# Patient Record
Sex: FEMALE | Race: WHITE | NOT HISPANIC OR LATINO | URBAN - METROPOLITAN AREA
[De-identification: names, ages, dates, MRNs, and addresses within clinical notes are randomized per-mention and may not be internally consistent; named-entity substitution may affect disease eponyms.]

---
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
## 2022-02-18 ENCOUNTER — VBI (OUTPATIENT)
Dept: ADMINISTRATIVE | Facility: OTHER | Age: 22
End: 2022-02-18

## 2022-02-18 NOTE — TELEPHONE ENCOUNTER
Upon review of the In Basket request we were able to locate, review, and update the patient chart as requested for Chlamydia  Any additional questions or concerns should be emailed to the Practice Liaisons via [email or web address] email, please do not reply via In Basket      Thank you  Farheen Larkin

## 2022-03-03 PROBLEM — Q51.22: Status: ACTIVE | Noted: 2022-03-03

## 2022-03-04 ENCOUNTER — OFFICE VISIT (OUTPATIENT)
Dept: OBGYN CLINIC | Facility: CLINIC | Age: 22
End: 2022-03-04
Payer: COMMERCIAL

## 2022-03-04 VITALS
DIASTOLIC BLOOD PRESSURE: 72 MMHG | BODY MASS INDEX: 26.34 KG/M2 | SYSTOLIC BLOOD PRESSURE: 120 MMHG | WEIGHT: 173.8 LBS | HEIGHT: 68 IN

## 2022-03-04 DIAGNOSIS — N90.89 VULVAR IRRITATION: ICD-10-CM

## 2022-03-04 DIAGNOSIS — Q51.22 PARTIAL DOUBLING OF UTERUS: Primary | ICD-10-CM

## 2022-03-04 DIAGNOSIS — R39.9 SYMPTOMS OF URINARY TRACT INFECTION: ICD-10-CM

## 2022-03-04 LAB
BV WHIFF TEST VAG QL: NORMAL
CLUE CELLS SPEC QL WET PREP: NORMAL
PH SMN: 4 [PH]
SL AMB  POCT GLUCOSE, UA: NEGATIVE
SL AMB LEUKOCYTE ESTERASE,UA: ABNORMAL
SL AMB POCT BLOOD,UA: ABNORMAL
SL AMB POCT KETONES,UA: NEGATIVE
SL AMB POCT NITRITE,UA: NEGATIVE
SL AMB POCT PH,UA: 5
SL AMB POCT SPECIFIC GRAVITY,UA: 1.02
SL AMB POCT URINE PROTEIN: NEGATIVE
T VAGINALIS VAG QL WET PREP: NORMAL
YEAST VAG QL WET PREP: NORMAL

## 2022-03-04 PROCEDURE — 99214 OFFICE O/P EST MOD 30 MIN: CPT | Performed by: OBSTETRICS & GYNECOLOGY

## 2022-03-04 PROCEDURE — 87210 SMEAR WET MOUNT SALINE/INK: CPT | Performed by: OBSTETRICS & GYNECOLOGY

## 2022-03-04 PROCEDURE — 81002 URINALYSIS NONAUTO W/O SCOPE: CPT | Performed by: OBSTETRICS & GYNECOLOGY

## 2022-03-04 RX ORDER — RISPERIDONE 1 MG/1
1.25 TABLET, FILM COATED ORAL DAILY
COMMUNITY

## 2022-03-04 RX ORDER — FLUCONAZOLE 150 MG/1
150 TABLET ORAL ONCE
Qty: 1 TABLET | Refills: 0 | Status: SHIPPED | OUTPATIENT
Start: 2022-03-04 | End: 2022-03-04

## 2022-03-04 RX ORDER — LISDEXAMFETAMINE DIMESYLATE 50 MG
CAPSULE ORAL
COMMUNITY
Start: 2022-03-03

## 2022-03-04 RX ORDER — BUSPIRONE HYDROCHLORIDE 30 MG/1
30 TABLET ORAL 2 TIMES DAILY
COMMUNITY

## 2022-03-04 NOTE — PROGRESS NOTES
[address]   [address], [address], [facility] [place]    Assessment/Plan:  1  Partial doubling of uterus  Assessment & Plan:  2020 MRI confirmed partial uterine septum  Pt now wishes removal   Discussed w/ pt this is not something our office does, as this is a specialized procedure  I recommend Dr Beverly Nelson, at [facility], who has performed this for other patient's of mine  Gave pt contact information  She is to call me if has trouble arranging consult  She is to call [facility] and obtain copy of MRI imaging to take to appointment  2  Symptoms of urinary tract infection  Comments:  recent UTI - U/A her small LE and trace blood  Will send Urine culture as UA nonspecific  Pt to see PCP later today  Orders:  -     Urine culture  -     POCT urine dip    3  Vulvar irritation  Comments:  exam unremarkable, wet mount normal today  Discussed low sensitivity for yeast infection  Given recent Abx use will tx with Diflucan  Orders:  -     fluconazole (DIFLUCAN) 150 mg tablet; Take 1 tablet (150 mg total) by mouth once for 1 dose  -     POCT wet mount          Subjective:   Amy Dill is a 22 y o  [place] female  CC: bleeding issues, vulvar irritation, wishes to discuss uterine septum      HPI:   Patient presents with multiple issues: bleeding problems, vulvar irritation and wishes to discuss tx of uterine septum  Pt with progesterone IUD  Since placement - pt reports about every 3 months bleeding about a day  Mostly spotting  Sometimes uses tampons  Sometimes cramps  Last week episode of heavy cramps  with vaginal bleeding for 1 day, last week  Previously dx with uterine septum on u/s - confirmed by MRI  Previously had declined intervention, as was asx, now asking about operation for removal     Pt  treated last week at [facility] with back pain, vomiting fever and urinary urgency  Negative for Covid and UTI but treated with antibiotics for UTI   Pt  now has burning with urination and frequency and back pain  Started Doxycycline last Friday and still taking  Feels symptoms worsened - no pain with urination  But now feels "on fire"  Last month bladder and kidney u/s normal with PCP per pt  Multiple UTIs last couple months  Seeing PCP later today  No vaginal discharge  No new partners last year  Gyn History  Patient's last menstrual period was 2022 (within days)  Last pap smear: n/a - age 22    She  reports being sexually active and has had partner(s) who are male  She reports using the following method of birth control/protection: I U D        OB History      Past Medical History:  No date: Anxiety  No date: Autism  2018: Concussion      Comment:  Tripped over recliner     Past Surgical History:  2019: INSERTION OF INTRAUTERINE DEVICE (IUD)     Social History     Tobacco Use    Smoking status: Never Smoker    Smokeless tobacco: Never Used   Vaping Use    Vaping Use: Former    Substances: CBD   Substance Use Topics    Alcohol use: Yes     Comment: social    Drug use: Not Currently          Current Outpatient Medications:     busPIRone (BUSPAR) 30 MG tablet, Take 30 mg by mouth 2 (two) times a day, Disp: , Rfl:     Doxycycline 100 mg caps- [facility] 7 DAY SUPPLY BOTTLE, SENT WITH PATIENT (VIBRAMYCIN), Take 1 Bottle by mouth once  Take one capsule by mouth twice a day until finished , Disp: , Rfl:     risperiDONE (RisperDAL) 1 mg tablet, Take 1 25 mg by mouth daily, Disp: , Rfl:     Vyvanse 50 MG capsule, TAKE ONE CAPSULE BY MOUTH EVERY DAY fill ON 22, Disp: , Rfl:     She is allergic to diphenhydramine and gluten meal - food allergy       ROS: Review of Systems   Constitutional: Negative  Gastrointestinal: Negative  Genitourinary: Negative  Negative for dyspareunia, dysuria, genital sores and pelvic pain  Vulvar irritation   Psychiatric/Behavioral: Negative          Objective:  /72   Ht 5' 7 5" (1 715 m)   Wt 78 8 kg (173 lb 12 8 oz)   LMP 02/25/2022 (Within Days)   Breastfeeding No   BMI 26 82 kg/m²      Physical Exam  Constitutional:       Appearance: Normal appearance  Genitourinary:     General: Normal vulva  Vagina: Normal  No vaginal discharge, erythema or bleeding  Cervix: Normal       Uterus: Normal  Not enlarged and not tender  Adnexa:         Right: No mass or tenderness  Left: No mass or tenderness  Rectum: No external hemorrhoid  Comments: IUD string at os  Neurological:      Mental Status: She is alert     Psychiatric:         Mood and Affect: Mood normal          Behavior: Behavior normal

## 2022-03-04 NOTE — PATIENT INSTRUCTIONS
Urinalysis in office   - small LE, trace blood, otherwise normal   - will send urine to lab for culture    Vulvar irritation   - likely yeast infection from antibiotics   - Diflucan to pharmacy    Recurrent urinary tract infection   - follow up with PCP   - consider urology referral    For treatment of uterine septum:   - call office of Dr Cynthia Michelle, [phone], [facility] for consult    Please tell them you were referred by Dr Arik Reynolds   - please call [facility], [phone], to ask for a disc with images from your 9/2020 pelvic MRI and any pelvic ultrasounds to take to your visit

## 2022-03-06 LAB
BACTERIA UR CULT: NORMAL
Lab: NO GROWTH

## 2022-04-04 ENCOUNTER — TELEPHONE (OUTPATIENT)
Dept: OBGYN CLINIC | Facility: CLINIC | Age: 22
End: 2022-04-04

## 2022-04-04 NOTE — TELEPHONE ENCOUNTER
Nancy l/alexander she cannot find the phone number for the surgeon dr Corinna Zhong recommended  Would like call back  Per Dr Nasir Redd note>Dr Merline Spell [phone]   Left detailed message on v/m per v/m instruction

## 2022-06-06 ENCOUNTER — OFFICE VISIT (OUTPATIENT)
Dept: URGENT CARE | Facility: CLINIC | Age: 22
End: 2022-06-06
Payer: COMMERCIAL

## 2022-06-06 ENCOUNTER — TELEPHONE (OUTPATIENT)
Dept: OTHER | Facility: OTHER | Age: 22
End: 2022-06-06

## 2022-06-06 VITALS
SYSTOLIC BLOOD PRESSURE: 108 MMHG | RESPIRATION RATE: 18 BRPM | OXYGEN SATURATION: 98 % | DIASTOLIC BLOOD PRESSURE: 74 MMHG | TEMPERATURE: 98.2 F | HEART RATE: 78 BPM

## 2022-06-06 DIAGNOSIS — R21 RASH: Primary | ICD-10-CM

## 2022-06-06 PROCEDURE — G0382 LEV 3 HOSP TYPE B ED VISIT: HCPCS | Performed by: NURSE PRACTITIONER

## 2022-06-06 RX ORDER — NYSTATIN 100000 [USP'U]/G
POWDER TOPICAL 2 TIMES DAILY
Qty: 30 G | Refills: 0 | Status: SHIPPED | OUTPATIENT
Start: 2022-06-06

## 2022-06-06 RX ORDER — METHOCARBAMOL 500 MG/1
TABLET, FILM COATED ORAL
COMMUNITY
Start: 2022-04-30

## 2022-06-06 NOTE — TELEPHONE ENCOUNTER
This is a new patient and she would like you to please call Mariposa Tavares at [phone] and as for her medical records

## 2022-06-06 NOTE — PATIENT INSTRUCTIONS
Poison Ivy   WHAT YOU NEED TO KNOW:   Poison ivy is a plant that can cause an itchy, uncomfortable rash on your skin  Poison ivy grows as a shrub or vine in woods, fields, and areas of thick [place]  It has 3 bright green leaves on each stem that turn red in [place]  DISCHARGE INSTRUCTIONS:   Medicines:   Antiseptic or drying creams or ointments: These medicines may be used to dry out the rash and decrease the itching  These products may be available without a doctor's order  Steroids: This medicine helps decrease itching and inflammation  It can be given as a cream to apply to your skin or as a pill  Antihistamines: This medicine may help decrease itching and help you sleep  It is available without a doctor's order  Take your medicine as directed  Contact your healthcare provider if you think your medicine is not helping or if you have side effects  Tell him or her if you are allergic to any medicine  Keep a list of the medicines, vitamins, and herbs you take  Include the amounts, and when and why you take them  Bring the list or the pill bottles to follow-up visits  Carry your medicine list with you in case of an emergency  Follow up with your doctor as directed:  Write down your questions so you remember to ask them during your visits  How your poison ivy rash spreads: You cannot spread poison ivy by touching your rash or the liquid from your blisters  Poison ivy is spread only if you scratch your skin while it still has oil on it  You may think your rash is spreading because new rashes appear over a number of days  This happens because areas covered by thin skin break out in a rash first  Your face or forearms may develop a rash before thicker areas, such as the palms of your hands  Self-care:   Keep your rash clean and dry:  Wash it with soap and water  Gently pat it dry with a clean towel  Try not to scratch or rub your rash: This can cause your skin to become infected      Use a compress on your rash:  Dip a clean washcloth in cool water  Wring it out and place it on your rash  Leave the washcloth on your skin for 15 minutes  Do this at least 3 times per day  Take a cornstarch or oatmeal bath: If your rash is too large to cover with wet washcloths, take 3 or 4 cornstarch baths daily  Mix 1 pound of cornstarch with a little water to make a paste  Add the paste to a tub full of water and mix well  You may also use colloidal oatmeal in the bath water  Use lukewarm water  Avoid hot water because it may cause your itching to increase  Prevent a poison ivy rash in the future:   Wear skin protection:  Wear long pants, a long-sleeved shirt, and gloves  Use a skin block lotion to protect your skin from poison ivy oil  You can find this at a drugstore without a prescription  Wash clothing after possible exposure: If you think you have been near a poison ivy plant, wash the clothes you were wearing separately from other clothes  Rinse the washing machine well after you take the clothes out  Scrub boots and shoes with warm, soapy water  Dry clean items and clothing that you cannot wash in water  Poison ivy oil is sticky and can stay on surfaces for a long time  It can cause a new rash even years later  Bathe your pet:  Use warm water and shampoo on your pet's fur  This will prevent the spread of oil to your skin, car, and home  Wear long sleeves, long pants, and gloves while washing pets or any items that may have oil on them  Reduce exposure to poison ivy:  Do not touch plants that look like poison ivy  Keep your yard free of poison ivy  While protecting your skin, remove the plant and the roots  Place them in a plastic bag and seal the bag tightly  Do not burn poison ivy plants: This can spread the oil through the air  If you breathe the oil into your lungs, you could have swelling and serious breathing problems   Oil that clings to the fire brady can land on your skin and cause a rash  Contact your healthcare provider if:   You have pus, soft yellow scabs, or tenderness on the rash  The itching gets worse or keeps you awake at night  The rash covers more than 1/4 of your skin or spreads to your eyes, mouth, or genital area  The rash is not better after 2 to 3 weeks  You have tender, swollen glands on the sides of your neck  You have swelling in your arms and legs  You have questions or concerns about your condition or care  Seek care immediately or call 911 if:   You have a fever  You have redness, swelling, and tenderness around the rash  You have trouble breathing  © Copyright [employer] 2022 Information is for End User's use only and may not be sold, redistributed or otherwise used for commercial purposes  All illustrations and images included in CareNotes® are the copyrighted property of [employer]  or Fadi Tapia  The above information is an  only  It is not intended as medical advice for individual conditions or treatments  Talk to your doctor, nurse or pharmacist before following any medical regimen to see if it is safe and effective for you  Skin Yeast Infection   AMBULATORY CARE:   What do I need to know about a skin yeast infection? Yeast is normally present on the skin  Infection happens when you have too much yeast, or when it gets into a cut on your skin  Certain types of mold and fungus can cause a yeast infection  A skin yeast infection can appear anywhere on your skin or nail beds  Skin yeast infections are usually found on warm, moist parts of the body  Examples include between skin folds or under the breasts  Common symptoms include the following:  Signs and symptoms will depend on the type of yeast causing the infection, and where the infection is located    Red, scaly skin    Changes in skin color, especially a beefy red color    Itching, dry skin    Painful, cracking skin at the corners of your mouth    Thick, discolored, chipping nails    Skin lesions that may be red or purple and round    Pus bumps    Seek care immediately if:   You have signs of infection, such as pus, warmth or red streaks coming from the wound, or a fever  Call your doctor if:   Your symptoms worsen or do not get better within 7 to 10 days  You have new or returning signs of a skin yeast infection after treatment  You have questions or concerns about your condition or care  Treatment for a skin yeast infection  may include antifungal medicine to treat the fungal infection  The medicine be given as a cream, ointment, or pill  Care for the skin near the infection:  You may only have discolored patches of skin, or areas that are dry and flaking  Care for these skin problems as directed by your healthcare provider  If you have painful skin or an open sore, you will need to protect the skin and prevent damage  You will also need to keep the skin dry as much as possible  Ask your healthcare provider how to care for your skin while the infection clears  The following are general guidelines for caring for painful or open skin:  Keep the skin clean  Ask your healthcare provider if you should wash with mild soap and water  Do not use soap that contains alcohol  Alcohol can dry and irritate the skin and make symptoms worse  Your baby's healthcare provider may tell you to use diaper cream or ointment when you change his or her diaper  This will protect the skin and prevent moisture from collecting  Keep the skin dry  Pat the area dry with a towel  Do not rub, because this may irritate the skin  If you have a skin yeast infection between skin folds, lift the top part gently and hold it while you dry between your skin folds  Always dry your feet completely after you swim or bathe, including between your toes  Dry your skin if you are sweating from exercise or exposure to heat   Use a clean towel each time to prevent spreading or continuing the infection  Keep the skin protected  Ask your healthcare provider if you should cover the area with a bandage or leave it open  Check your skin each day to make sure you do not have new or worsening problems  You may need to have someone check the skin if you cannot see the area easily  Prevent another skin yeast infection:   Do not share clothing or towels    Wear shower shoes if you need to use a public shower    Dry your feet completely after you bathe, and apply antifungal powder or cream as directed    Put on socks before you get dressed so you do not spread fungus from your feet    Wear light clothing that allows air to get to your skin    Manage your weight to prevent skin folds where yeast can collect    Manage diabetes    Use antibiotics correctly to prevent antibiotic resistance    Change your baby's diaper often, and keep the area clean and dry as much as possible    Use a diaper cream or ointment that contains zinc oxide or dimethicone on your baby's diaper area as directed    Follow up with your doctor as directed:  Write down your questions so you remember to ask them during your visits  © Copyright [employer] 2022 Information is for End User's use only and may not be sold, redistributed or otherwise used for commercial purposes  All illustrations and images included in CareNotes® are the copyrighted property of A D A M , Inc  or [facility] Kady Erazo   The above information is an  only  It is not intended as medical advice for individual conditions or treatments  Talk to your doctor, nurse or pharmacist before following any medical regimen to see if it is safe and effective for you

## 2022-06-06 NOTE — PROGRESS NOTES
[facility]        NAME: Fortunato Diehl is a 25 y o  female  : 2000    MRN: [id number]  DATE: 2022  TIME: 6:34 PM    Assessment and Plan   Rash [R21]  1  Rash  nystatin (MYCOSTATIN) powder     Fungal under left breast - start nystatin powder - keep dry and clean     Contact derm on arm and leg - start a 24 hour antihistamine and topical hydrocortisone  Patient Instructions     Follow up with PCP in 3-5 days  Proceed to  ER if symptoms worsen  Chief Complaint     Chief Complaint   Patient presents with    Rash     Rash to abdomen and left arm for 2 weeks  Using OTC meds with no relief  History of Present Illness   Fortunato Diehl presents to the clinic c/o    Pt states has a rash under her left breast for the past 2 weeks   Itchy and burning sensation   Recently noticed some itchy bumps on upper left leg and also on left lower arm  Tried otc creams with no improvement  Lives with boyfriend - he is a   Review of Systems   Review of Systems   All other systems reviewed and are negative  Current Medications     Long-Term Medications   Medication Sig Dispense Refill    nystatin (MYCOSTATIN) powder Apply topically 2 (two) times a day 30 g 0    busPIRone (BUSPAR) 30 MG tablet Take 30 mg by mouth 2 (two) times a day (Patient not taking: Reported on 2022)      methocarbamol (ROBAXIN) 500 mg tablet TAKE 1 TABLET BY MOUTH 4 TIMES A DAY AS NEEDED FOR MUSCLE SPASMS        risperiDONE (RisperDAL) 1 mg tablet Take 1 25 mg by mouth daily      Vyvanse 50 MG capsule TAKE ONE CAPSULE BY MOUTH EVERY DAY fill ON 22         Current Allergies     Allergies as of 2022 - Reviewed 2022   Allergen Reaction Noted    Diphenhydramine Other (See Comments) 2021    Gluten meal - food allergy Diarrhea, GI Intolerance, and Other (See Comments) 2014            The following portions of the patient's history were reviewed and updated as appropriate: allergies, current medications, past family history, past medical history, past social history, past surgical history and problem list     Objective   /74   Pulse 78   Temp 98 2 °F (36 8 °C) (Tympanic)   Resp 18   SpO2 98%        Physical Exam     Physical Exam  Vitals and nursing note reviewed  Constitutional:       Appearance: Normal appearance  She is well-developed  HENT:      Head: Normocephalic and atraumatic  Eyes:      General: Lids are normal       Conjunctiva/sclera: Conjunctivae normal    Cardiovascular:      Rate and Rhythm: Normal rate and regular rhythm  Heart sounds: Normal heart sounds, S1 normal and S2 normal    Pulmonary:      Effort: Pulmonary effort is normal       Breath sounds: Normal breath sounds  Chest:       Skin:     General: Skin is warm and dry  Neurological:      Mental Status: She is alert and oriented to person, place, and time  Psychiatric:         Speech: Speech normal          Behavior: Behavior normal  Behavior is cooperative  Thought Content:  Thought content normal          Judgment: Judgment normal

## 2022-06-07 ENCOUNTER — OFFICE VISIT (OUTPATIENT)
Dept: GASTROENTEROLOGY | Facility: MEDICAL CENTER | Age: 22
End: 2022-06-07
Payer: COMMERCIAL

## 2022-06-07 ENCOUNTER — APPOINTMENT (OUTPATIENT)
Dept: LAB | Facility: MEDICAL CENTER | Age: 22
End: 2022-06-07
Attending: INTERNAL MEDICINE
Payer: COMMERCIAL

## 2022-06-07 VITALS
BODY MASS INDEX: 26.55 KG/M2 | HEART RATE: 108 BPM | WEIGHT: 175.2 LBS | OXYGEN SATURATION: 98 % | SYSTOLIC BLOOD PRESSURE: 112 MMHG | HEIGHT: 68 IN | DIASTOLIC BLOOD PRESSURE: 74 MMHG

## 2022-06-07 DIAGNOSIS — R79.89 ELEVATED LFTS: Primary | ICD-10-CM

## 2022-06-07 DIAGNOSIS — K76.0 FATTY LIVER: ICD-10-CM

## 2022-06-07 DIAGNOSIS — K59.00 CONSTIPATION, UNSPECIFIED CONSTIPATION TYPE: ICD-10-CM

## 2022-06-07 DIAGNOSIS — Z83.79 FAMILY HISTORY OF CELIAC DISEASE: ICD-10-CM

## 2022-06-07 DIAGNOSIS — R79.89 ELEVATED LFTS: ICD-10-CM

## 2022-06-07 LAB
ALBUMIN SERPL BCP-MCNC: 3.9 G/DL (ref 3.5–5)
ALP SERPL-CCNC: 96 U/L (ref 46–116)
ALT SERPL W P-5'-P-CCNC: 56 U/L (ref 12–78)
ANION GAP SERPL CALCULATED.3IONS-SCNC: 5 MMOL/L (ref 4–13)
AST SERPL W P-5'-P-CCNC: 32 U/L (ref 5–45)
BASOPHILS # BLD AUTO: 0.04 THOUSANDS/ΜL (ref 0–0.1)
BASOPHILS NFR BLD AUTO: 0 % (ref 0–1)
BILIRUB SERPL-MCNC: 0.32 MG/DL (ref 0.2–1)
BUN SERPL-MCNC: 10 MG/DL (ref 5–25)
CALCIUM SERPL-MCNC: 9.6 MG/DL (ref 8.3–10.1)
CHLORIDE SERPL-SCNC: 107 MMOL/L (ref 100–108)
CO2 SERPL-SCNC: 26 MMOL/L (ref 21–32)
CREAT SERPL-MCNC: 0.65 MG/DL (ref 0.6–1.3)
EOSINOPHIL # BLD AUTO: 0.44 THOUSAND/ΜL (ref 0–0.61)
EOSINOPHIL NFR BLD AUTO: 5 % (ref 0–6)
ERYTHROCYTE [DISTWIDTH] IN BLOOD BY AUTOMATED COUNT: 12.6 % (ref 11.6–15.1)
GFR SERPL CREATININE-BSD FRML MDRD: 127 ML/MIN/1.73SQ M
GLUCOSE SERPL-MCNC: 81 MG/DL (ref 65–140)
HCT VFR BLD AUTO: 38.1 % (ref 34.8–46.1)
HGB BLD-MCNC: 12.6 G/DL (ref 11.5–15.4)
IMM GRANULOCYTES # BLD AUTO: 0.03 THOUSAND/UL (ref 0–0.2)
IMM GRANULOCYTES NFR BLD AUTO: 0 % (ref 0–2)
INR PPP: 1.01 (ref 0.84–1.19)
LYMPHOCYTES # BLD AUTO: 2.63 THOUSANDS/ΜL (ref 0.6–4.47)
LYMPHOCYTES NFR BLD AUTO: 29 % (ref 14–44)
MCH RBC QN AUTO: 27.6 PG (ref 26.8–34.3)
MCHC RBC AUTO-ENTMCNC: 33.1 G/DL (ref 31.4–37.4)
MCV RBC AUTO: 83 FL (ref 82–98)
MONOCYTES # BLD AUTO: 0.45 THOUSAND/ΜL (ref 0.17–1.22)
MONOCYTES NFR BLD AUTO: 5 % (ref 4–12)
NEUTROPHILS # BLD AUTO: 5.44 THOUSANDS/ΜL (ref 1.85–7.62)
NEUTS SEG NFR BLD AUTO: 61 % (ref 43–75)
NRBC BLD AUTO-RTO: 0 /100 WBCS
PLATELET # BLD AUTO: 378 THOUSANDS/UL (ref 149–390)
PMV BLD AUTO: 10.2 FL (ref 8.9–12.7)
POTASSIUM SERPL-SCNC: 3.8 MMOL/L (ref 3.5–5.3)
PROT SERPL-MCNC: 8.2 G/DL (ref 6.4–8.2)
PROTHROMBIN TIME: 12.9 SECONDS (ref 11.6–14.5)
RBC # BLD AUTO: 4.57 MILLION/UL (ref 3.81–5.12)
SODIUM SERPL-SCNC: 138 MMOL/L (ref 136–145)
WBC # BLD AUTO: 9.03 THOUSAND/UL (ref 4.31–10.16)

## 2022-06-07 PROCEDURE — 86258 DGP ANTIBODY EACH IG CLASS: CPT

## 2022-06-07 PROCEDURE — 85610 PROTHROMBIN TIME: CPT

## 2022-06-07 PROCEDURE — 82977 ASSAY OF GGT: CPT

## 2022-06-07 PROCEDURE — 99244 OFF/OP CNSLTJ NEW/EST MOD 40: CPT | Performed by: INTERNAL MEDICINE

## 2022-06-07 PROCEDURE — 86364 TISS TRNSGLTMNASE EA IG CLAS: CPT

## 2022-06-07 PROCEDURE — 84450 TRANSFERASE (AST) (SGOT): CPT

## 2022-06-07 PROCEDURE — 83010 ASSAY OF HAPTOGLOBIN QUANT: CPT

## 2022-06-07 PROCEDURE — 84460 ALANINE AMINO (ALT) (SGPT): CPT

## 2022-06-07 PROCEDURE — 80053 COMPREHEN METABOLIC PANEL: CPT

## 2022-06-07 PROCEDURE — 82947 ASSAY GLUCOSE BLOOD QUANT: CPT

## 2022-06-07 PROCEDURE — 85025 COMPLETE CBC W/AUTO DIFF WBC: CPT

## 2022-06-07 PROCEDURE — 83883 ASSAY NEPHELOMETRY NOT SPEC: CPT

## 2022-06-07 PROCEDURE — 82172 ASSAY OF APOLIPOPROTEIN: CPT

## 2022-06-07 PROCEDURE — 87340 HEPATITIS B SURFACE AG IA: CPT

## 2022-06-07 PROCEDURE — 82247 BILIRUBIN TOTAL: CPT

## 2022-06-07 PROCEDURE — 86038 ANTINUCLEAR ANTIBODIES: CPT

## 2022-06-07 PROCEDURE — 86704 HEP B CORE ANTIBODY TOTAL: CPT

## 2022-06-07 PROCEDURE — 86381 MITOCHONDRIAL ANTIBODY EACH: CPT

## 2022-06-07 PROCEDURE — 82465 ASSAY BLD/SERUM CHOLESTEROL: CPT

## 2022-06-07 PROCEDURE — 86231 EMA EACH IG CLASS: CPT

## 2022-06-07 PROCEDURE — 36415 COLL VENOUS BLD VENIPUNCTURE: CPT

## 2022-06-07 PROCEDURE — 86015 ACTIN ANTIBODY EACH: CPT

## 2022-06-07 PROCEDURE — 86705 HEP B CORE ANTIBODY IGM: CPT

## 2022-06-07 PROCEDURE — 86803 HEPATITIS C AB TEST: CPT

## 2022-06-07 PROCEDURE — 84478 ASSAY OF TRIGLYCERIDES: CPT

## 2022-06-07 PROCEDURE — 80074 ACUTE HEPATITIS PANEL: CPT

## 2022-06-07 PROCEDURE — 82784 ASSAY IGA/IGD/IGG/IGM EACH: CPT

## 2022-06-07 NOTE — PROGRESS NOTES
[facility] - Outpatient Consultation  Nancy Castro 25 y o  female MRN: [id number]  Encounter: [id number]          ASSESSMENT AND PLAN:    Masoud Beard is a 25 y o  female who presents with complaint of FH of celiac disease, abnormal stools with occasional constipation, elevated LFTs who presents to establish care  CBC from September with hemoglobin of 11 9 but otherwise normal   Prior CMP from 2020 with low albumin at 3 6, elevated AST and ALT, normal total bilirubin  Prior CRP 1 6   DQ 2 positive, DQ8 negative  Fatty liver could be from medication (depakote) vs NAFLD vs alcohol (less likely)  Constipation could be from IBS-C vs CIC vs medication effect vs functional      1  Elevated LFTs    2  Constipation, unspecified constipation type    3  Family history of celiac disease    4  Fatty liver        Orders Placed This Encounter   Procedures    US abdomen limited    SAAB Fibrosure    SUMMER Screen w/ Reflex to Titer/Pattern    Anti-smooth muscle antibody, IgG    Antimitochondrial antibody    Comprehensive metabolic panel    Protime-INR    CBC and differential    Chronic Hepatitis Panel    Hepatitis panel, acute    Celiac Disease Antibody Profile     Repeat CBC, CMP, hepatitis panel, SUMMER, ASMA, AMA  Obtain SAAB FibroSure  Avoid alcohol  Right upper quadrant ultrasound  Recommend gluten challenge followed by celiac blood work  Drink at least 8 cups of water per day  High fiber diet  Miralax as needed  ______________________________________________________________________    Referred by Dr Monique Flanagan for possible celiac, elevated LFTs  HPI:    Masoud Beard is a 25 y o  female who presents with complaint of fatty liver and concern for celiac  No heartburn, dysphagia, odynophagia, nausea, vomiting, diarrhea  She has some constipation and can go 2-3 days (even up to 1 week) without a BM  Usually she goes every day and constipation can occur every 2 weeks   She eats cheese and bread and maybe it makes it work  She is eating wheat and has gotten pimples and more constipation  She does not drink much water  She drinks soda  No BRBPR, melena, abdominal pain, weight loss  No prior colonoscopy  No prior EGD  + FH of celiac in mother, uncle, aunt, brother  No FH of colon cancer or other GI related issues  She drinks alcohol every so often and if she drinks a lot she has pain in her RUQ  She drinks whiskey and coke, less than 8 ounces  REVIEW OF SYSTEMS:  10 point ROS reviewed and negative, except as above      Historical Information   Past Medical History:   Diagnosis Date    Anxiety     Autism     Concussion 2018    Tripped over recliner     Past Surgical History:   Procedure Laterality Date    INSERTION OF INTRAUTERINE DEVICE (IUD)  03/2019     Social History   Social History     Substance and Sexual Activity   Alcohol Use Yes    Comment: social     Social History     Substance and Sexual Activity   Drug Use Not Currently     Social History     Tobacco Use   Smoking Status Never Smoker   Smokeless Tobacco Never Used     Family History   Problem Relation Age of Onset    Breast cancer Neg Hx     Uterine cancer Neg Hx     Ovarian cancer Neg Hx     Colon cancer Neg Hx        Meds/Allergies       Current Outpatient Medications:     nystatin (MYCOSTATIN) powder    busPIRone (BUSPAR) 30 MG tablet    Doxycycline 100 mg caps- SANE, [facility] 7 DAY SUPPLY BOTTLE, SENT WITH PATIENT (VIBRAMYCIN)    methocarbamol (ROBAXIN) 500 mg tablet    risperiDONE (RisperDAL) 1 mg tablet    Vyvanse 50 MG capsule    Allergies   Allergen Reactions    Diphenhydramine Other (See Comments)    Gluten Meal - Food Allergy Diarrhea, GI Intolerance and Other (See Comments)     Has one of the celiac genes             Objective     Blood pressure 112/74, pulse (!) 108, height 5' 7 5" (1 715 m), weight 79 5 kg (175 lb 3 2 oz), SpO2 98 %, not currently breastfeeding   Body mass index is 27 04 kg/m²       PHYSICAL EXAMINATION:    General Appearance:   Alert, cooperative, no distress   HEENT:  Normocephalic, atraumatic, anicteric  Neck supple, symmetrical, trachea midline  Lungs:   Equal chest rise and unlabored breathing, normal effort, no coughing  Cardiovascular:   No visualized JVD  Abdomen:   No abdominal distension  Skin:   No jaundice, rashes, or lesions  Musculoskeletal:   Normal range of motion visualized  Psych:  Normal affect and normal insight  Neuro:  Alert and appropriate  Lab Results:   No visits with results within 1 Day(s) from this visit  Latest known visit with results is:   Office Visit on 03/04/2022   Component Date Value    Urine Culture Result 03/04/2022 Final report     Yeast, Wet Prep 03/04/2022 neg     pH 03/04/2022 4 0     Whiff Test 03/04/2022 neg     Clue Cells 03/04/2022 neg     Trich, Wet Prep 03/04/2022 neg     LEUKOCYTE ESTERASE,UA 03/04/2022 small     NITRITE,UA 03/04/2022 negative     POCT URINE PROTEIN 03/04/2022 negative      PH,UA 03/04/2022 5 0     BLOOD,UA 03/04/2022 trace     SPECIFIC GRAVITY,UA 03/04/2022 1 025     KETONES,UA 03/04/2022 negative     GLUCOSE, UA 03/04/2022 negative     Result 1 03/04/2022 No growth        No results found for: WBC, HGB, HCT, MCV, PLT    No results found for: NA, SODIUM, K, CL, CO2, ANIONGAP, AGAP, BUN, CREATININE, GLUC, GLUF, CALCIUM, AST, ALT, ALKPHOS, PROT, TP, BILITOT, TBILI, EGFR    No results found for: CRP    No results found for: [id number], TSH    No results found for: IRON, TIBC, FERRITIN    Radiology Results:   No results found

## 2022-06-07 NOTE — PATIENT INSTRUCTIONS
Today we discussed:  Blood work  Avoid alcohol  Liver ultrasound  Recommend gluten challenge followed by celiac blood work     Drink at least 8 cups of water per day  High fiber diet  Miralax as needed

## 2022-06-08 LAB
HAV IGM SER QL: NORMAL
HBV CORE AB SER QL: NORMAL
HBV CORE IGM SER QL: NORMAL
HBV CORE IGM SER QL: NORMAL
HBV SURFACE AG SER QL: NORMAL
HBV SURFACE AG SER QL: NORMAL
HCV AB SER QL: NORMAL
HCV AB SER QL: NORMAL
RYE IGE QN: NEGATIVE

## 2022-06-09 LAB
ACTIN IGG SERPL-ACNC: 7 UNITS (ref 0–19)
ENDOMYSIUM IGA SER QL: NEGATIVE
GLIADIN PEPTIDE IGA SER-ACNC: 5 UNITS (ref 0–19)
GLIADIN PEPTIDE IGG SER-ACNC: 2 UNITS (ref 0–19)
IGA SERPL-MCNC: 208 MG/DL (ref 87–352)
MITOCHONDRIA M2 IGG SER-ACNC: <20 UNITS (ref 0–20)
TTG IGA SER-ACNC: <2 U/ML (ref 0–3)
TTG IGG SER-ACNC: 3 U/ML (ref 0–5)

## 2022-06-10 LAB
A2 MACROGLOB SERPL-MCNC: 172 MG/DL (ref 110–276)
ALT SERPL W P-5'-P-CCNC: 47 IU/L (ref 0–40)
APO A-I SERPL-MCNC: 100 MG/DL (ref 116–209)
AST SERPL W P-5'-P-CCNC: 38 IU/L (ref 0–40)
BILIRUB SERPL-MCNC: 0.2 MG/DL (ref 0–1.2)
CHOLEST SERPL-MCNC: 150 MG/DL (ref 100–199)
FIBROSIS SCORING:: ABNORMAL
FIBROSIS STAGE SERPL QL: ABNORMAL
GGT SERPL-CCNC: 26 IU/L (ref 0–60)
GLUCOSE SERPL-MCNC: 80 MG/DL (ref 65–99)
HAPTOGLOB SERPL-MCNC: 123 MG/DL (ref 33–278)
LABORATORY COMMENT REPORT: ABNORMAL
LIVER FIBR SCORE SERPL CALC.FIBROSURE: 0.06 (ref 0–0.21)
NECROINFLAMMATORY ACT GRADE SERPL QL: ABNORMAL
NECROINFLAMMATORY ACT SCORE SERPL: 0.5
SERVICE CMNT-IMP: ABNORMAL
SL AMB INTERPRETATION: ABNORMAL
SL AMB NASH SCORING: ABNORMAL
SL AMB STEATOSIS GRADE: ABNORMAL
SL AMB STEATOSIS SCORE: 0.47 (ref 0–0.3)
STEATOSIS GRADING: ABNORMAL
TRIGL SERPL-MCNC: 103 MG/DL (ref 0–149)

## 2022-06-11 ENCOUNTER — TELEPHONE (OUTPATIENT)
Dept: OBGYN CLINIC | Facility: CLINIC | Age: 22
End: 2022-06-11

## 2022-06-11 NOTE — TELEPHONE ENCOUNTER
Patient called stating she is having vaginal bleeding that started today  Patient desires to know if it is caused by her IUD Jenaro Manzanareschula) that was inserted in 2019  Informed patient that it is unlikely that her IUD moved spontaneously  However, advised patient to observe bleeding pattern and inform GYN and schedule follow up in office, if vaginal bleeding lasts longer than 5 days  Patient to present to ER if has heavy vaginal bleeding or pelvic pain  Patient denies any chance of being pregnant

## 2022-07-02 ENCOUNTER — HOSPITAL ENCOUNTER (OUTPATIENT)
Dept: ULTRASOUND IMAGING | Facility: HOSPITAL | Age: 22
Discharge: HOME | End: 2022-07-02
Attending: INTERNAL MEDICINE
Payer: MEDICARE

## 2022-07-02 DIAGNOSIS — R79.89 ELEVATED LFTS: ICD-10-CM

## 2022-07-02 DIAGNOSIS — K76.0 FATTY LIVER: ICD-10-CM

## 2022-07-02 PROCEDURE — 76705 ECHO EXAM OF ABDOMEN: CPT

## 2022-07-27 ENCOUNTER — OFFICE VISIT (OUTPATIENT)
Dept: OBGYN CLINIC | Facility: MEDICAL CENTER | Age: 22
End: 2022-07-27
Payer: MEDICARE

## 2022-07-27 VITALS
BODY MASS INDEX: 27.78 KG/M2 | WEIGHT: 177 LBS | HEIGHT: 67 IN | SYSTOLIC BLOOD PRESSURE: 120 MMHG | DIASTOLIC BLOOD PRESSURE: 70 MMHG

## 2022-07-27 DIAGNOSIS — B37.9 YEAST INFECTION: Primary | ICD-10-CM

## 2022-07-27 PROCEDURE — 87491 CHLMYD TRACH DNA AMP PROBE: CPT | Performed by: OBSTETRICS & GYNECOLOGY

## 2022-07-27 PROCEDURE — 81514 NFCT DS BV&VAGINITIS DNA ALG: CPT | Performed by: OBSTETRICS & GYNECOLOGY

## 2022-07-27 PROCEDURE — 87591 N.GONORRHOEAE DNA AMP PROB: CPT | Performed by: OBSTETRICS & GYNECOLOGY

## 2022-07-27 PROCEDURE — 99213 OFFICE O/P EST LOW 20 MIN: CPT | Performed by: OBSTETRICS & GYNECOLOGY

## 2022-07-27 RX ORDER — FLUCONAZOLE 150 MG/1
150 TABLET ORAL DAILY
Qty: 2 TABLET | Refills: 0 | Status: SHIPPED | OUTPATIENT
Start: 2022-07-27 | End: 2022-07-29

## 2022-07-27 NOTE — PROGRESS NOTES
A/P    Discharge   Patient reports that she has been having some creamy yellow discharge some itching   The discharge has been for approximately 1 week   No pain     Current partner over 1 years      Patient wishes to have STD testing     On exam noted to have a yeast infection   Antibiotics  Sent       Physical Exam  Exam conducted with a chaperone present  Genitourinary:     Exam position: Supine  Pubic Area: Rash present  Vagina: Vaginal discharge present  Cervix: Discharge present        Uterus: Normal        Adnexa: Right adnexa normal and left adnexa normal

## 2022-07-28 LAB
C GLABRATA DNA VAG QL NAA+PROBE: NEGATIVE
C KRUSEI DNA VAG QL NAA+PROBE: NEGATIVE
C TRACH DNA SPEC QL NAA+PROBE: NEGATIVE
CANDIDA SP 6 PNL VAG NAA+PROBE: POSITIVE
N GONORRHOEA DNA SPEC QL NAA+PROBE: NEGATIVE
T VAGINALIS DNA VAG QL NAA+PROBE: NEGATIVE
VAGINOSIS/ITIS DNA PNL VAG PROBE+SIG AMP: NEGATIVE

## 2022-08-01 DIAGNOSIS — N76.0 BV (BACTERIAL VAGINOSIS): Primary | ICD-10-CM

## 2022-08-01 DIAGNOSIS — B96.89 BV (BACTERIAL VAGINOSIS): Primary | ICD-10-CM

## 2022-08-01 RX ORDER — METRONIDAZOLE 500 MG/1
500 TABLET ORAL EVERY 12 HOURS SCHEDULED
Qty: 10 TABLET | Refills: 0 | Status: SHIPPED | OUTPATIENT
Start: 2022-08-01 | End: 2022-08-06

## 2022-08-08 ENCOUNTER — OFFICE VISIT (OUTPATIENT)
Dept: URGENT CARE | Facility: CLINIC | Age: 22
End: 2022-08-08
Payer: MEDICARE

## 2022-08-08 VITALS
SYSTOLIC BLOOD PRESSURE: 118 MMHG | RESPIRATION RATE: 16 BRPM | OXYGEN SATURATION: 98 % | DIASTOLIC BLOOD PRESSURE: 72 MMHG | TEMPERATURE: 98.6 F | HEART RATE: 86 BPM

## 2022-08-08 DIAGNOSIS — J06.9 ACUTE URI: Primary | ICD-10-CM

## 2022-08-08 PROCEDURE — 99213 OFFICE O/P EST LOW 20 MIN: CPT | Performed by: NURSE PRACTITIONER

## 2022-08-09 NOTE — PROGRESS NOTES
[employer]        NAME: Lida Bridges is a 25 y o  female  : 2000    MRN: [id number]  DATE: 2022  TIME: 8:23 PM    Assessment and Plan   Acute URI [J06 9]  1  Acute URI       recommed sudafed for congestion, cepacol for sore throat, robitussin for cough   May repeat covid test , however, able to work with mask as it has been 5 days  Rest, fluids, hydration, increase vit c reviewed with pt   Pt in agreement with plan     Patient Instructions     Follow up with PCP in 3-5 days  Proceed to  ER if symptoms worsen  Chief Complaint     Chief Complaint   Patient presents with    Cold Like Symptoms     Patient with c/o sore throat, aches, and runny nose since Wednesday         History of Present Illness   Lida Bridges presents to the clinic c/o    Runny nose, fatigue, sore throat, headache since wed  Exposed to someone at work with covid - was within 6 feet of them for more than 15 minutes   Not taking anything   Did a covid test on Friday that was negative   Did not repeat   Has a child who is not ill      Review of Systems   Review of Systems   All other systems reviewed and are negative  Current Medications     Long-Term Medications   Medication Sig Dispense Refill    busPIRone (BUSPAR) 30 MG tablet Take 30 mg by mouth 2 (two) times a day (Patient not taking: No sig reported)      methocarbamol (ROBAXIN) 500 mg tablet TAKE 1 TABLET BY MOUTH 4 TIMES A DAY AS NEEDED FOR MUSCLE SPASMS   (Patient not taking: No sig reported)      nystatin (MYCOSTATIN) powder Apply topically 2 (two) times a day (Patient not taking: No sig reported) 30 g 0    risperiDONE (RisperDAL) 1 mg tablet Take 1 25 mg by mouth daily (Patient not taking: No sig reported)      Vyvanse 50 MG capsule TAKE ONE CAPSULE BY MOUTH EVERY DAY fill ON 22 (Patient not taking: No sig reported)         Current Allergies     Allergies as of 2022 - Reviewed 2022   Allergen Reaction Noted    Diphenhydramine Other (See Comments) 09/20/2021    Gluten meal - food allergy Diarrhea, GI Intolerance, and Other (See Comments) 04/01/2014            The following portions of the patient's history were reviewed and updated as appropriate: allergies, current medications, past family history, past medical history, past social history, past surgical history and problem list     Objective   /72   Pulse 86   Temp 98 6 °F (37 °C) (Tympanic)   Resp 16   SpO2 98%        Physical Exam     Physical Exam  Vitals and nursing note reviewed  Constitutional:       Appearance: Normal appearance  She is well-developed  HENT:      Head: Normocephalic and atraumatic  Right Ear: Hearing, tympanic membrane, ear canal and external ear normal       Left Ear: Hearing, tympanic membrane, ear canal and external ear normal       Nose: Mucosal edema and congestion present  Right Sinus: No maxillary sinus tenderness or frontal sinus tenderness  Left Sinus: No maxillary sinus tenderness or frontal sinus tenderness  Mouth/Throat:      Mouth: Mucous membranes are moist       Pharynx: Posterior oropharyngeal erythema present  No pharyngeal swelling, oropharyngeal exudate or uvula swelling  Tonsils: No tonsillar exudate or tonsillar abscesses  0 on the right  0 on the left  Eyes:      General: Lids are normal       Extraocular Movements: Extraocular movements intact  Conjunctiva/sclera: Conjunctivae normal       Pupils: Pupils are equal, round, and reactive to light  Cardiovascular:      Rate and Rhythm: Normal rate and regular rhythm  Heart sounds: Normal heart sounds, S1 normal and S2 normal    Pulmonary:      Effort: Pulmonary effort is normal       Breath sounds: Normal breath sounds  Musculoskeletal:      Cervical back: Normal range of motion and neck supple  Skin:     General: Skin is warm and dry  Neurological:      Mental Status: She is alert and oriented to person, place, and time  Psychiatric:         Speech: Speech normal          Behavior: Behavior normal  Behavior is cooperative  Thought Content:  Thought content normal          Judgment: Judgment normal

## 2022-08-09 NOTE — PATIENT INSTRUCTIONS
Recommend sudafed for congestion   Robitussin for cough  Cepacol for sore throat    Viral Syndrome   AMBULATORY CARE:   Viral syndrome  is a term used for symptoms of an infection caused by a virus  Viruses are spread easily from person to person through the air and on shared items  Signs and symptoms  may start slowly or suddenly and last hours to days  They can be mild to severe and can change over days or hours  You may have any of the following:  Fever and chills    A runny or stuffy nose    Cough, sore throat, or hoarseness    Headache, or pain and pressure around your eyes    Muscle aches and joint pain    Shortness of breath or wheezing    Abdominal pain, cramps, and diarrhea    Nausea, vomiting, or loss of appetite    Call your local emergency number (911 in the [address] [place] [address],[address]) or have someone else call if:   You have a seizure  You cannot be woken  You have chest pain or trouble breathing  Seek care immediately if:   You have a stiff neck, a bad headache, and sensitivity to light  You feel weak, dizzy, or confused  You stop urinating or urinate a lot less than usual     You cough up blood or thick yellow or green mucus  You have severe abdominal pain or your abdomen is larger than usual     Call your doctor if:   Your symptoms do not get better with treatment or get worse after 3 days  You have a rash or ear pain  You have burning when you urinate  You have questions or concerns about your condition or care  Treatment for viral syndrome  may include medicines to manage your symptoms  Antibiotics are not given for a viral infection  You may  need any of the following:  Acetaminophen  decreases pain and fever  It is available without a doctor's order  Ask how much to take and how often to take it  Follow directions   Read the labels of all other medicines you are using to see if they also contain acetaminophen, or ask your doctor or pharmacist  Acetaminophen can cause liver damage if not taken correctly  Do not use more than 4 grams (4,000 milligrams) total of acetaminophen in one day  NSAIDs , such as ibuprofen, help decrease swelling, pain, and fever  NSAIDs can cause stomach bleeding or kidney problems in certain people  If you take blood thinner medicine, always ask your healthcare provider if NSAIDs are safe for you  Always read the medicine label and follow directions  Cold medicine  helps decrease swelling, control a cough, and relieve chest or nasal congestion  Saline nasal spray  helps decrease nasal congestion  Manage your symptoms:   Drink liquids as directed to prevent dehydration  Ask how much liquid to drink each day and which liquids are best for you  Ask if you should drink an oral rehydration solution (ORS)  An ORS has the right amounts of water, salts, and sugar you need to replace body fluids  This may help prevent dehydration caused by vomiting or diarrhea  Do not drink liquids with caffeine  Liquids with caffeine can make dehydration worse  Get plenty of rest to help your body heal   Take naps throughout the day  Ask your healthcare provider when you can return to work and your normal activities  Use a cool mist humidifier to help you breathe easier  Ask your healthcare provider how to use a cool mist humidifier  Eat honey or use cough drops for a sore throat  Cough drops are available without a doctor's order  Follow directions for taking cough drops  Do not smoke or be close to anyone who is smoking  Nicotine and other chemicals in cigarettes and cigars can cause lung damage  Smoking can also delay healing  Ask your healthcare provider for information if you currently smoke and need help to quit  E-cigarettes or smokeless tobacco still contain nicotine  Talk to your healthcare provider before you use these products  Prevent the spread of germs:       Wash your hands often  Wash your hands several times each day   Wash after you use the bathroom, change a child's diaper, and before you prepare or eat food  Use soap and water every time  Rub your soapy hands together, lacing your fingers  Wash the front and back of your hands, and in between your fingers  Use the fingers of one hand to scrub under the fingernails of the other hand  Wash for at least 20 seconds  Rinse with warm, running water for several seconds  Then dry your hands with a clean towel or paper towel  Use hand  that contains alcohol if soap and water are not available  Do not touch your eyes, nose, or mouth without washing your hands first          Cover a sneeze or cough  Use a tissue that covers your mouth and nose  Throw the tissue away in a trash can right away  Use the bend of your arm if a tissue is not available  Wash your hands well with soap and water or use a hand   Stay away from others while you are sick  Avoid crowds as much as possible  Ask about vaccines you may need  Talk to your healthcare provider about your vaccine history  He or she will tell you which vaccines you need, and when to get them  Get the influenza (flu) vaccine as soon as recommended each year  The flu vaccine is available starting in September or October  Flu viruses change, so it is important to get a flu vaccine every year  Get the pneumonia vaccine if recommended  This vaccine is usually recommended every 5 years  Your provider will tell you when to get this vaccine, if needed  Follow up with your doctor as directed:  Write down your questions so you remember to ask them during your visits  © Copyright [employer] 2022 Information is for End User's use only and may not be sold, redistributed or otherwise used for commercial purposes  All illustrations and images included in CareNotes® are the copyrighted property of [employer]  or Fadi Tapia  The above information is an  only   It is not intended as medical advice for individual conditions or treatments  Talk to your doctor, nurse or pharmacist before following any medical regimen to see if it is safe and effective for you

## 2022-09-30 ENCOUNTER — TELEPHONE (OUTPATIENT)
Dept: OTHER | Facility: OTHER | Age: 22
End: 2022-09-30

## 2022-09-30 NOTE — TELEPHONE ENCOUNTER
Patient reports her IUD moved and she was seen at the ED  She would like to know if she can have a light duty note for work emailed to her  E-mail address in Epic verified  She would like a call back or a [employer] message to confirm

## 2022-10-03 ENCOUNTER — ULTRASOUND (OUTPATIENT)
Dept: OBGYN CLINIC | Facility: MEDICAL CENTER | Age: 22
End: 2022-10-03
Payer: MEDICARE

## 2022-10-03 VITALS
BODY MASS INDEX: 27.62 KG/M2 | DIASTOLIC BLOOD PRESSURE: 78 MMHG | HEIGHT: 67 IN | WEIGHT: 176 LBS | SYSTOLIC BLOOD PRESSURE: 118 MMHG

## 2022-10-03 DIAGNOSIS — T83.32XA MALPOSITIONED IUD, INITIAL ENCOUNTER: ICD-10-CM

## 2022-10-03 DIAGNOSIS — B37.31 VULVOVAGINAL CANDIDIASIS: Primary | ICD-10-CM

## 2022-10-03 DIAGNOSIS — Q51.28 SEPTATE UTERUS: ICD-10-CM

## 2022-10-03 LAB
BV WHIFF TEST VAG QL: NEGATIVE
CLUE CELLS SPEC QL WET PREP: NEGATIVE
T VAGINALIS VAG QL WET PREP: NEGATIVE
YEAST VAG QL WET PREP: POSITIVE

## 2022-10-03 PROCEDURE — 99214 OFFICE O/P EST MOD 30 MIN: CPT | Performed by: OBSTETRICS & GYNECOLOGY

## 2022-10-03 PROCEDURE — 87210 SMEAR WET MOUNT SALINE/INK: CPT | Performed by: OBSTETRICS & GYNECOLOGY

## 2022-10-03 RX ORDER — FLUCONAZOLE 150 MG/1
150 TABLET ORAL
Qty: 2 TABLET | Refills: 3 | Status: SHIPPED | OUTPATIENT
Start: 2022-10-03 | End: 2022-10-07

## 2022-10-03 NOTE — PROGRESS NOTES
Assessment:  25 y o  Terrance Alonzo who presents as a follow up from ER with concern for malpositioned IUD  Well positioned on US testing  Initial findings likely secondary to septate uterus  Yeast remains present  Plan:  Diagnoses and all orders for this visit:    Vulvovaginal candidiasis  -     fluconazole (DIFLUCAN) 150 mg tablet; Take 1 tablet (150 mg total) by mouth every 3 (three) days for 2 doses  - Hx recurrence  Refills provided  Discussed self treatment of symptoms is OK, but if does not resolve after 2 dose course, needs evaluation in office  Patient agreeable    Malpositioned IUD, initial encounter  - Well positioned at fundus  Slightly off centered, but appropriate in setting of uterine septum and well oriented otherwise    Septate uterus  - Has upcoming appt in Dec at [facility] for surgical consult  - Discussed hysteroscopy, metroplasty with pt in brief for expectations  Agree with expert consultation and management as planned    __________________________________________________________________    Subjective   Davidson Carney is a 25 y o  Terrance Alonzo who presents as a follow up from the ER on 9/28/22 for vaginal bleeding  Using Doe Nakayama IUD, placed 2019  Was told her IUD was sideways based on a pelvic XR  Symptoms started 6-7d ago  Started with yeast infection symptoms, started monistat but when using it it caused bleeding  Monistat 3 used  Used x2d  Still itchy and uncomfortable  Burning noted as well  Did not receive oral med from ER as expected  Reports with bleeding, she bled x4d, fairly heavy so she went to ER  Today it decreased and resolved  Cramping noted as well, severe enough to make her nauseated  Prior to IUD, had dysmenorrhea that felt similar  Lyletta use for last 3yrs  Typically amenorrheic on it and has had no issues beforehand  Has bled with other monistat courses before, but this seems to be only time  Hx also notable for uterine septum   S/p MRI and has appt with [facility] in Dec to discuss septum surgery  Also has a breast cyst  x2mo  Reports was tender and larger, decreasing now  Went to ER for same as well but felt not adequately assessed  Diagnosed as cellulitis in ER and abx provided  The following portions of the patient's history were reviewed and updated as appropriate: allergies, current medications, past medical history, past social history and problem list     Review of Systems  Review of Systems   Constitutional: Negative for chills, fatigue and fever  Respiratory: Negative for cough and shortness of breath  Cardiovascular: Negative for palpitations  Gastrointestinal: Positive for nausea (with cramping)  Negative for abdominal distention, abdominal pain and diarrhea  Genitourinary: Positive for menstrual problem (usually amenorrhea on IUD), vaginal bleeding (resolved today), vaginal discharge and vaginal pain  Negative for dyspareunia, frequency, genital sores and pelvic pain  Skin: Positive for wound (breast)  Neurological: Negative for dizziness and light-headedness  Objective  /78   Ht 5' 7" (1 702 m)   Wt 79 8 kg (176 lb)   BMI 27 57 kg/m²      Physical Exam:  Physical Exam  Exam conducted with a chaperone present  Constitutional:       General: She is not in acute distress  Appearance: Normal appearance  She is not ill-appearing, toxic-appearing or diaphoretic  Eyes:      General: No scleral icterus  Right eye: No discharge  Left eye: No discharge  Conjunctiva/sclera: Conjunctivae normal    Cardiovascular:      Rate and Rhythm: Normal rate  Pulmonary:      Effort: Pulmonary effort is normal  No respiratory distress  Chest:   Breasts:      Right: Skin change (8mm flat, erythemtous lesion with dry, scaling skin overlying  suspect healing folliculitis vs abscess) present  No bleeding, inverted nipple or mass  Abdominal:      General: There is no distension  Palpations: There is no mass        Tenderness: There is no abdominal tenderness  There is no guarding or rebound  Hernia: No hernia is present  Genitourinary:     Exam position: Lithotomy position  Labia:         Right: Rash present  No tenderness or lesion  Left: Rash present  No tenderness or lesion  Vagina: No signs of injury  Vaginal discharge (white, thin) and erythema present  No tenderness or bleeding  Cervix: No cervical motion tenderness, friability, lesion, erythema or cervical bleeding  Musculoskeletal:         General: No swelling  Skin:     General: Skin is warm and dry  Coloration: Skin is not jaundiced or pale  Findings: No bruising or erythema  Neurological:      Mental Status: She is alert  Psychiatric:         Mood and Affect: Mood normal          Behavior: Behavior normal          Thought Content: Thought content normal          Judgment: Judgment normal        TVUS  Well positioned IUD at the fundus  Septate vs arcuate morphology (known septate by MRI)  T-arm of IUD is in appropriate orientation in the right side of uterine septum                   Microscopy consistent with yeast    Reviewed  [facility] Notes 3/4/2022  "1  Partial doubling of uterus  Assessment & Plan:  2020 MRI confirmed partial uterine septum  Pt now wishes removal   Discussed w/ pt this is not something our office does, as this is a specialized procedure  I recommend Dr Amy Kimbrough, at [facility], who has performed this for other patient's of mine  Gave pt contact information  She is to call me if has trouble arranging consult    She is to call [facility] and obtain copy of MRI imaging to take to appointment "

## 2022-11-01 ENCOUNTER — OFFICE VISIT (OUTPATIENT)
Dept: GASTROENTEROLOGY | Facility: CLINIC | Age: 22
End: 2022-11-01

## 2022-11-01 VITALS
OXYGEN SATURATION: 99 % | TEMPERATURE: 98.4 F | WEIGHT: 176.2 LBS | HEIGHT: 67 IN | SYSTOLIC BLOOD PRESSURE: 115 MMHG | HEART RATE: 88 BPM | BODY MASS INDEX: 27.65 KG/M2 | DIASTOLIC BLOOD PRESSURE: 75 MMHG

## 2022-11-01 DIAGNOSIS — K59.00 CONSTIPATION, UNSPECIFIED CONSTIPATION TYPE: Primary | ICD-10-CM

## 2022-11-01 DIAGNOSIS — K82.8 GALLBLADDER SLUDGE: ICD-10-CM

## 2022-11-01 DIAGNOSIS — K76.0 FATTY LIVER: ICD-10-CM

## 2022-11-01 NOTE — PATIENT INSTRUCTIONS
Recommend small weight loss via diet and exercise with goal of 10-15 pounds over the next 6-12 months  Try to limit alcohol  Drink at least 8 cups of water per day  High-fiber diet    Can add fiber One cereal, Scandinavian GG crackers, oat bran or oatmeal  Can add Benefiber or metamucil  MiraLax as needed if no bowel movement over 2-3 days
WDL

## 2022-11-01 NOTE — PROGRESS NOTES
[facility] - Outpatient Follow-up Note  Nancy Castro 25 y o  female MRN: [id number]  Encounter: [id number]          ASSESSMENT AND PLAN:    Mansi Harry is a 25 y o  female with family history of celiac disease who presented in June with abnormal stools including occasional constipation, elevated LFTs, here for follow-up  Still with constipation  Some abdominal pain when drinking alcohol  Also with some fatty liver  Prior CRP 1 6  The Q 2 positive, DQ 8 negative  Abdominal ultrasound with mild hepatic steatosis and gallbladder sludge  Most recent blood work notable for normal CMP  Negative celiac serologies  CBC with normal white blood cell count, hemoglobin, platelets  Normal INR  Hepatitis screen negative  AMA, ASMA negative  SUMMER negative  Kadi Lion or with no fibrosis, borderline or probable SAAB, mild steatosis  Constipation could be from irritable bowel syndrome versus chronic idiopathic constipation versus medication effect versus functional constipation versus pelvic dyssynergia versus a combination of the above  1  Constipation, unspecified constipation type    2  Fatty liver    3  Gallbladder sludge        No orders of the defined types were placed in this encounter  Recommend small weight loss via diet and exercise with goal of 10-15 pounds over the next 6-12 months  Try to limit alcohol  Drink at least 8 cups of water per day  High-fiber diet  Can add fiber One cereal, Scandinavian GG crackers, oat bran or oatmeal  Can add Benefiber or metamucil  MiraLax as needed if no bowel movement over 2-3 days  ______________________________________________________________________    SUBJECTIVE:    Mansi Harry is a 25 y o  female who presents with complaint of constipation  No heartburn, dysphagia, odynophagia, nausea, vomiting    + abnormal stools with diarrhea and constipation  It can be even   Some days she goes a lot and other times will go 3 days without a BM  She does not take anything for it  She does not use Miralax  No BRBPR, melena, abdominal pain  She lost 10 lbs intentionally  REVIEW OF SYSTEMS IS OTHERWISE NEGATIVE  10 point ROS reviewed and negative, except as above      Historical Information   Past Medical History:   Diagnosis Date   • Anxiety    • Autism    • Concussion 2018    Tripped over recliner     Past Surgical History:   Procedure Laterality Date   • INSERTION OF INTRAUTERINE DEVICE (IUD)  03/2019     Social History   Social History     Substance and Sexual Activity   Alcohol Use Not Currently    Comment: social     Social History     Substance and Sexual Activity   Drug Use Yes   • Types: Marijuana    Comment: medical marijuana     Social History     Tobacco Use   Smoking Status Never Smoker   Smokeless Tobacco Never Used     Family History   Problem Relation Age of Onset   • Breast cancer Neg Hx    • Uterine cancer Neg Hx    • Ovarian cancer Neg Hx    • Colon cancer Neg Hx        Meds/Allergies       Current Outpatient Medications:   •  busPIRone (BUSPAR) 30 MG tablet  •  Doxycycline 100 mg caps- SANE, HOMESTAR 7 DAY SUPPLY BOTTLE, SENT WITH PATIENT (VIBRAMYCIN)  •  methocarbamol (ROBAXIN) 500 mg tablet  •  nystatin (MYCOSTATIN) powder  •  risperiDONE (RisperDAL) 1 mg tablet  •  Vyvanse 50 MG capsule    Allergies   Allergen Reactions   • Diphenhydramine Other (See Comments)   • Gluten Meal - Food Allergy Diarrhea, GI Intolerance and Other (See Comments)     Has one of the celiac genes             Objective     Blood pressure 115/75, pulse 88, temperature 98 4 °F (36 9 °C), temperature source Tympanic, height 5' 7" (1 702 m), weight 79 9 kg (176 lb 3 2 oz), SpO2 99 %, not currently breastfeeding  Body mass index is 27 6 kg/m²  PHYSICAL EXAMINATION:    General Appearance:   Alert, cooperative, no distress   HEENT:  Normocephalic, atraumatic, anicteric  Neck supple, symmetrical, trachea midline     Lungs:   Equal chest rise and unlabored breathing, normal effort, no coughing  Cardiovascular:   No visualized JVD  Abdomen:   No abdominal distension  Skin:   No jaundice, rashes, or lesions  Musculoskeletal:   Normal range of motion visualized  Psych:  Normal affect and normal insight  Neuro:  Alert and appropriate  Lab Results:   No visits with results within 1 Day(s) from this visit  Latest known visit with results is:   Ultrasound on 10/03/2022   Component Date Value   • Yeast, Wet Prep 10/03/2022 positive    • Whiff Test 10/03/2022 negative    • Clue Cells 10/03/2022 negative    • Trich, Wet Prep 10/03/2022 negative        Lab Results   Component Value Date    WBC 9 03 06/07/2022    HGB 12 6 06/07/2022    HCT 38 1 06/07/2022    MCV 83 06/07/2022     06/07/2022       Lab Results   Component Value Date    SODIUM 138 06/07/2022    K 3 8 06/07/2022     06/07/2022    CO2 26 06/07/2022    AGAP 5 06/07/2022    BUN 10 06/07/2022    CREATININE 0 65 06/07/2022    GLUC 81 06/07/2022    GLUC 80 06/07/2022    CALCIUM 9 6 06/07/2022    AST 32 06/07/2022    AST 38 06/07/2022    ALT 56 06/07/2022    ALT 47 (H) 06/07/2022    ALKPHOS 96 06/07/2022    TP 8 2 06/07/2022    TBILI 0 32 06/07/2022    EGFR 127 06/07/2022       No results found for: CRP    No results found for: [id number], TSH    No results found for: IRON, TIBC, FERRITIN    Radiology Results:   No results found

## 2022-12-10 ENCOUNTER — NURSE TRIAGE (OUTPATIENT)
Dept: OTHER | Facility: OTHER | Age: 22
End: 2022-12-10

## 2022-12-10 NOTE — TELEPHONE ENCOUNTER
TC sent to Dr Nicki Jorgensen regarding this patient's problem and request for medication  He responded back that she has a Diflucan order from 10/3 with 3 refills  Filled 10/3, 10/21 and still has one refill left  I called this patient back and made her aware and she stated that she did not realize that she still had a refill left  She will go and pick it up now

## 2022-12-10 NOTE — TELEPHONE ENCOUNTER
Reason for Disposition  • Bad smelling vaginal discharge    Answer Assessment - Initial Assessment Questions  1  DISCHARGE: "Describe the discharge " (e g , white, yellow, green, gray, foamy, cottage cheese-like)      Yellowish/white discharge  2  ODOR: "Is there a bad odor?"      Foul odor  3  ONSET: "When did the discharge begin?"      Tuesday 4  RASH: "Is there a rash in that area?" If Yes, ask: "Describe it " (e g , redness, blisters, sores, bumps)      None  5  ABDOMINAL PAIN: "Are you having any abdominal pain?" If Yes, ask: "What does it feel like? " (e g , crampy, dull, intermittent, constant)       None  6  ABDOMINAL PAIN SEVERITY: If present, ask: "How bad is it?"  (e g , mild, moderate, severe)   - MILD - doesn't interfere with normal activities    - MODERATE - interferes with normal activities or awakens from sleep    - SEVERE - patient doesn't want to move (R/O peritonitis)       N/A  7  CAUSE: "What do you think is causing the discharge?" "Have you had the same problem before? What happened then?"      infection  8  OTHER SYMPTOMS: "Do you have any other symptoms?" (e g , fever, itching, vaginal bleeding, pain with urination, injury to genital area, vaginal foreign body)      Redden and itchiness  9   PREGNANCY: "Is there any chance you are pregnant?" "When was your last menstrual period?"      IUD in place    Protocols [facility]

## 2022-12-10 NOTE — TELEPHONE ENCOUNTER
Regarding: vaginal discharge/itchy  ----- Message from [facility] sent at 12/10/2022 12:13 PM EST -----  "I believe I have a bacterial infection    I have a white vaginal discharge with a touch of yellow along with itchiness "

## 2022-12-14 ENCOUNTER — OFFICE VISIT (OUTPATIENT)
Dept: OBGYN CLINIC | Facility: MEDICAL CENTER | Age: 22
End: 2022-12-14

## 2022-12-14 VITALS
SYSTOLIC BLOOD PRESSURE: 120 MMHG | WEIGHT: 167 LBS | BODY MASS INDEX: 26.21 KG/M2 | HEIGHT: 67 IN | DIASTOLIC BLOOD PRESSURE: 78 MMHG

## 2022-12-14 DIAGNOSIS — B96.89 BV (BACTERIAL VAGINOSIS): Primary | ICD-10-CM

## 2022-12-14 DIAGNOSIS — N76.0 BV (BACTERIAL VAGINOSIS): Primary | ICD-10-CM

## 2022-12-14 RX ORDER — METRONIDAZOLE 500 MG/1
500 TABLET ORAL EVERY 12 HOURS SCHEDULED
Qty: 14 TABLET | Refills: 0 | Status: SHIPPED | OUTPATIENT
Start: 2022-12-14 | End: 2022-12-21

## 2022-12-14 NOTE — PROGRESS NOTES
Assessment/Plan  Diagnoses and all orders for this visit:    BV (bacterial vaginosis)  -     metroNIDAZOLE (FLAGYL) 500 mg tablet; Take 1 tablet (500 mg total) by mouth every 12 (twelve) hours for 7 days      IUD strings seen   KOH and wet mount done on office positive for clue cells   Medication discussed and sent to pharmacy   Subjective   Joselyn Lopez is a 25 y o  female here for a problem visit  Patient is complaining of vaginal discharge and irritation since Saturday  Tried one dose of diflucan with no improvement   Has had BV in past and feels like that    Patient Active Problem List   Diagnosis   • Partial doubling of uterus       Gynecologic History  No LMP recorded (lmp unknown)  Patient has had an implant       Past Medical History:   Diagnosis Date   • Anxiety    • Autism    • Concussion 2018    Tripped over recliner     Past Surgical History:   Procedure Laterality Date   • INSERTION OF INTRAUTERINE DEVICE (IUD)  03/2019     Family History   Problem Relation Age of Onset   • Breast cancer Neg Hx    • Uterine cancer Neg Hx    • Ovarian cancer Neg Hx    • Colon cancer Neg Hx      Social History     Socioeconomic History   • Marital status: Single     Spouse name: Not on file   • Number of children: Not on file   • Years of education: Not on file   • Highest education level: Not on file   Occupational History   • Not on file   Tobacco Use   • Smoking status: Never   • Smokeless tobacco: Never   Vaping Use   • Vaping Use: Former   • Substances: CBD   Substance and Sexual Activity   • Alcohol use: Not Currently     Comment: social   • Drug use: Yes     Types: Marijuana     Comment: medical marijuana   • Sexual activity: Yes     Partners: Male     Birth control/protection: I U D     Other Topics Concern   • Not on file   Social History Narrative    Sexual Abuse Have you been sexually abused? no    Exercise: Occassionally    Domestic violence: No    History of drug/alcohol abuse denies    Sexual Activity sexually active >1 partner in last year         Social Determinants of Health     Financial Resource Strain: Not on file   Food Insecurity: Not on file   Transportation Needs: Not on file   Physical Activity: Not on file   Stress: Not on file   Social Connections: Not on file   Intimate Partner Violence: Not on file   Housing Stability: Not on file     Allergies   Allergen Reactions   • Diphenhydramine Other (See Comments)   • Gluten Meal - Food Allergy Diarrhea, GI Intolerance and Other (See Comments)     Has one of the celiac genes         Current Outpatient Medications:   •  busPIRone (BUSPAR) 30 MG tablet, Take 30 mg by mouth 2 (two) times a day (Patient not taking: Reported on 6/6/2022), Disp: , Rfl:   •  Doxycycline 100 mg caps- SANE, [facility] 7 DAY SUPPLY BOTTLE, SENT WITH PATIENT (VIBRAMYCIN), Take 1 Bottle by mouth once  Take one capsule by mouth twice a day until finished  (Patient not taking: Reported on 6/7/2022), Disp: , Rfl:   •  methocarbamol (ROBAXIN) 500 mg tablet, TAKE 1 TABLET BY MOUTH 4 TIMES A DAY AS NEEDED FOR MUSCLE SPASMS  (Patient not taking: Reported on 6/7/2022), Disp: , Rfl:   •  nystatin (MYCOSTATIN) powder, Apply topically 2 (two) times a day (Patient not taking: Reported on 7/27/2022), Disp: 30 g, Rfl: 0  •  risperiDONE (RisperDAL) 1 mg tablet, Take 1 25 mg by mouth daily (Patient not taking: Reported on 6/7/2022), Disp: , Rfl:   •  Vyvanse 50 MG capsule, TAKE ONE CAPSULE BY MOUTH EVERY DAY fill ON 2-9-22 (Patient not taking: Reported on 6/7/2022), Disp: , Rfl:     Review of Systems  Constitutional :no fever, feels well, no tiredness, no recent weight gain or loss  ENT: no ear ache, no loss of hearing, no nosebleeds or nasal discharge, no sore throat or hoarseness  Cardiovascular: no complaints of slow or fast heart beat, no chest pain, no palpitations, no leg claudication or lower extremity edema    Respiratory: no complaints of shortness of shortness of breath, no BENITEZ  Breasts:no complaints of breast pain, breast lump, or nipple discharge  Gastrointestinal: no complaints of abdominal pain, constipation, nausea, vomiting, or diarrhea or bloody stools  Genitourinary :  as noted in HPI  Musculoskeletal: no complaints of arthralgia, no myalgia, no joint swelling or stiffness, no limb pain or swelling  Integumentary: no complaints of skin rash or lesion, itching or dry skin  Neurological: no complaints of headache, no confusion, no numbness or tingling, no dizziness or fainting     Objective     /78   Ht 5' 7" (1 702 m)   Wt 75 8 kg (167 lb)   LMP  (LMP Unknown)   BMI 26 16 kg/m²     General:   appears stated age, cooperative, alert normal mood and affect   Lungs: Unlabored breathing     Abdomen: soft, non-tender, without masses or organomegaly   Vulva: normal   Vagina: normal vagina, no discharge, exudate, lesion, or erythema   Urethra: normal   Cervix: Normal, no discharge  Nontender  Uterus: normal size, contour, position, consistency, mobility, non-tender   Adnexa: no mass, fullness, tenderness   Lymphatic palpation of lymph nodes in neck, axilla, groin and/or other locations: no lymphadenopathy or masses noted   Skin normal skin turgor and no rashes     Psychiatric orientation to person, place, and time: normal  mood and affect: normal

## 2023-01-05 ENCOUNTER — HOSPITAL ENCOUNTER (EMERGENCY)
Facility: HOSPITAL | Age: 23
Discharge: HOME | End: 2023-01-05
Attending: EMERGENCY MEDICINE

## 2023-01-05 VITALS
SYSTOLIC BLOOD PRESSURE: 133 MMHG | DIASTOLIC BLOOD PRESSURE: 82 MMHG | BODY MASS INDEX: 25.9 KG/M2 | HEART RATE: 102 BPM | RESPIRATION RATE: 16 BRPM | TEMPERATURE: 98.1 F | WEIGHT: 165.34 LBS | OXYGEN SATURATION: 99 %

## 2023-01-05 DIAGNOSIS — J20.9 ACUTE BRONCHITIS: Primary | ICD-10-CM

## 2023-01-05 DIAGNOSIS — J06.9 URI (UPPER RESPIRATORY INFECTION): ICD-10-CM

## 2023-01-05 LAB
FLUAV RNA RESP QL NAA+PROBE: NEGATIVE
FLUBV RNA RESP QL NAA+PROBE: NEGATIVE
RSV RNA RESP QL NAA+PROBE: NEGATIVE
SARS-COV-2 RNA RESP QL NAA+PROBE: NEGATIVE

## 2023-01-05 RX ORDER — AZITHROMYCIN 250 MG/1
500 TABLET, FILM COATED ORAL ONCE
Status: COMPLETED | OUTPATIENT
Start: 2023-01-05 | End: 2023-01-05

## 2023-01-05 RX ORDER — ALBUTEROL SULFATE 90 UG/1
2 AEROSOL, METERED RESPIRATORY (INHALATION) ONCE
Status: COMPLETED | OUTPATIENT
Start: 2023-01-05 | End: 2023-01-05

## 2023-01-05 RX ORDER — PREDNISONE 50 MG/1
50 TABLET ORAL DAILY
Qty: 4 TABLET | Refills: 0 | Status: SHIPPED | OUTPATIENT
Start: 2023-01-06 | End: 2023-01-11

## 2023-01-05 RX ORDER — AZITHROMYCIN 250 MG/1
TABLET, FILM COATED ORAL
Qty: 4 TABLET | Refills: 0 | Status: SHIPPED | OUTPATIENT
Start: 2023-01-06 | End: 2023-01-09

## 2023-01-05 RX ADMIN — PREDNISONE 50 MG: 20 TABLET ORAL at 22:24

## 2023-01-05 RX ADMIN — AZITHROMYCIN 500 MG: 250 TABLET, FILM COATED ORAL at 22:24

## 2023-01-05 RX ADMIN — ALBUTEROL SULFATE 2 PUFF: 90 AEROSOL, METERED RESPIRATORY (INHALATION) at 22:24

## 2023-01-05 NOTE — Clinical Note
Mami Gilman was seen and treated in our emergency department on 1/5/2023  Diagnosis:     Nancy  may return to work on return date  She may return on this date: 01/09/2023         If you have any questions or concerns, please don't hesitate to call        Jody Nelson PA-C    ______________________________           _______________          _______________  Hospital Representative                              Date                                Time

## 2023-01-06 ENCOUNTER — NURSE TRIAGE (OUTPATIENT)
Dept: OTHER | Facility: OTHER | Age: 23
End: 2023-01-06

## 2023-01-06 ENCOUNTER — DOCUMENTATION (OUTPATIENT)
Dept: OTHER | Facility: HOSPITAL | Age: 23
End: 2023-01-06

## 2023-01-06 DIAGNOSIS — B37.9 YEAST INFECTION: Primary | ICD-10-CM

## 2023-01-06 RX ORDER — FLUCONAZOLE 150 MG/1
150 TABLET ORAL ONCE
Qty: 1 TABLET | Refills: 0 | Status: SHIPPED | OUTPATIENT
Start: 2023-01-06 | End: 2023-01-06

## 2023-01-06 NOTE — TELEPHONE ENCOUNTER
Regarding: medication concern  ----- Message from Krystle Panchal sent at 1/6/2023  4:10 PM EST -----  "I was in the ED for a issue with my bronchitis and I was an antibiotic  and I tend to get a yeast infection I wanted to se if some else can be called in I have surgery on 1/12/2023

## 2023-01-06 NOTE — TELEPHONE ENCOUNTER
Was seen yesterday in ED for Bronchitis and prescribed Prednisone and Azithromycin  She notes she has a hx of frequent yeast infections especially with abx use  Has Septate uterus sx scheduled for 1/12 and would like something called in for a yeast infection  Notes only mild vaginal itchiness at this time  On call provider notified    Reason for Disposition  • All other vaginal symptoms  (Exception: feels like prior yeast infection, minor abrasion, mild rash < 24 hour duration, mild itching)    Answer Assessment - Initial Assessment Questions  1  SYMPTOM: "What's the main symptom you're concerned about?" (e g , pain, itching, dryness)      itchiness  2  LOCATION: "Where is the  itchiness located?" (e g , inside/outside, left/right)      vaginal  3  ONSET: "When did the  itchiness  start?"      today  4  PAIN: "Is there any pain?" If Yes, ask: "How bad is it?" (Scale: 1-10; mild, moderate, severe)      denies  5  ITCHING: "Is there any itching?" If Yes, ask: "How bad is it?" (Scale: 1-10; mild, moderate, severe)      yes  7  OTHER SYMPTOMS: "Do you have any other symptoms?" (e g , fever, itching, vaginal bleeding, pain with urination, injury to genital area, vaginal foreign body)      denies  8   PREGNANCY: "Is there any chance you are pregnant?" "When was your last menstrual period?"      denies    Protocols used: [facility]

## 2023-01-06 NOTE — ED PROVIDER NOTES
History  Chief Complaint   Patient presents with   • Cough     Pt reports cough, congestion, chills for 3 days  Seen at [facility] today for same and prescribed medications  Reports medications not helping     Patient is a 26 y/o female with a PMH of anxiety who presents for evaluation of URI symptoms  She states symptoms started 3 days ago  Symptoms including fever (100F), chills, fatigue, cough, headache, bodyaches, runny nose/congestion, sinus pain  She states chest pain with coughing only  States she gets coughing fits where she can't catch her breath  She tried dayquil and nyquil at home without relief  Had influenza A 3 weeks ago but had resolution of those sypmtoms and got sick again  She states she works at [employer] so she's around lots of people  No known sick contacts/exposures  Went to [facility] and given viral instructions and liquid cold medication which she took once and states it didn't work  She denies other shortness of breath, wheezing, stridor, abdominal pain, nausea, vomiting, diarrhea, sore throat, difficulty urinating  She states she's planning to get surgery in 1 week for a septate uterus and wants to be better by then  Prior to Admission Medications   Prescriptions Last Dose Informant Patient Reported? Taking? Doxycycline 100 mg caps- [facility] 7 DAY SUPPLY BOTTLE, SENT WITH PATIENT (VIBRAMYCIN)   Yes No   Sig: Take 1 Bottle by mouth once  Take one capsule by mouth twice a day until finished  Patient not taking: Reported on 6/7/2022   Vyvanse 50 MG capsule   Yes No   Sig: TAKE ONE CAPSULE BY MOUTH EVERY DAY fill ON 2-9-22   Patient not taking: Reported on 6/7/2022   busPIRone (BUSPAR) 30 MG tablet   Yes No   Sig: Take 30 mg by mouth 2 (two) times a day   Patient not taking: Reported on 6/6/2022   methocarbamol (ROBAXIN) 500 mg tablet   Yes No   Sig: TAKE 1 TABLET BY MOUTH 4 TIMES A DAY AS NEEDED FOR MUSCLE SPASMS     Patient not taking: Reported on 6/7/2022   nystatin (MYCOSTATIN) powder   No No   Sig: Apply topically 2 (two) times a day   Patient not taking: Reported on 7/27/2022   risperiDONE (RisperDAL) 1 mg tablet   Yes No   Sig: Take 1 25 mg by mouth daily   Patient not taking: Reported on 6/7/2022      Facility-Administered Medications: None       Past Medical History:   Diagnosis Date   • Anxiety    • Autism    • Concussion 2018    Tripped over recliner       Past Surgical History:   Procedure Laterality Date   • INSERTION OF INTRAUTERINE DEVICE (IUD)  03/2019       Family History   Problem Relation Age of Onset   • Breast cancer Neg Hx    • Uterine cancer Neg Hx    • Ovarian cancer Neg Hx    • Colon cancer Neg Hx      I have reviewed and agree with the history as documented  E-Cigarette/Vaping   • E-Cigarette Use Former User      E-Cigarette/Vaping Substances   • Nicotine No    • THC No    • CBD Yes    • Flavoring No    • Other No    • Unknown No      Social History     Tobacco Use   • Smoking status: Never   • Smokeless tobacco: Never   Vaping Use   • Vaping Use: Former   • Substances: CBD   Substance Use Topics   • Alcohol use: Not Currently     Comment: social   • Drug use: Yes     Types: Marijuana     Comment: medical marijuana       Review of Systems   Constitutional: Positive for chills and fever  HENT: Positive for congestion, rhinorrhea, sinus pressure and sinus pain  Negative for sore throat  Respiratory: Positive for cough and chest tightness  Negative for wheezing and stridor  Cardiovascular: Negative for chest pain and leg swelling  Gastrointestinal: Negative for abdominal pain, diarrhea, nausea and vomiting  Genitourinary: Negative for difficulty urinating  Musculoskeletal: Positive for myalgias  Skin: Negative for rash  Neurological: Positive for headaches  All other systems reviewed and are negative  Physical Exam  Physical Exam  Vitals and nursing note reviewed  Constitutional:       General: She is not in acute distress  Appearance: Normal appearance  She is normal weight  She is not ill-appearing, toxic-appearing or diaphoretic  HENT:      Head: Normocephalic and atraumatic  Right Ear: Tympanic membrane, ear canal and external ear normal       Left Ear: Tympanic membrane, ear canal and external ear normal       Nose: Congestion and rhinorrhea present  Mouth/Throat:      Mouth: Mucous membranes are moist       Pharynx: Oropharynx is clear  No oropharyngeal exudate or posterior oropharyngeal erythema  Eyes:      Conjunctiva/sclera: Conjunctivae normal    Cardiovascular:      Rate and Rhythm: Normal rate and regular rhythm  Heart sounds: Normal heart sounds  No murmur heard  Pulmonary:      Effort: Pulmonary effort is normal  No respiratory distress  Breath sounds: Normal breath sounds  No stridor  No wheezing, rhonchi or rales  Chest:      Chest wall: Tenderness present  No deformity, swelling, crepitus or edema  Comments: Reproducible chest wall tenderness to palpation over costochondral joints bilaterally  Abdominal:      General: Abdomen is flat  There is no distension  Musculoskeletal:         General: Normal range of motion  Cervical back: Normal range of motion and neck supple  No rigidity  Lymphadenopathy:      Cervical: Cervical adenopathy present  Skin:     General: Skin is warm and dry  Capillary Refill: Capillary refill takes less than 2 seconds  Neurological:      Mental Status: She is alert     Psychiatric:         Mood and Affect: Mood normal          Vital Signs  ED Triage Vitals [01/05/23 1930]   Temperature Pulse Respirations Blood Pressure SpO2   98 1 °F (36 7 °C) 102 16 133/82 99 %      Temp Source Heart Rate Source Patient Position - Orthostatic VS BP Location FiO2 (%)   Oral Monitor -- -- --      Pain Score       --           Vitals:    01/05/23 1930   BP: 133/82   Pulse: 102         Visual Acuity      ED Medications  Medications   predniSONE tablet 50 mg (50 mg Oral Given 1/5/23 2224)   albuterol (PROVENTIL HFA,VENTOLIN HFA) inhaler 2 puff (2 puffs Inhalation Given 1/5/23 2224)   azithromycin (ZITHROMAX) tablet 500 mg (500 mg Oral Given 1/5/23 2224)       Diagnostic Studies  Results Reviewed     Procedure Component Value Units Date/Time    FLU/RSV/COVID - if FLU/RSV clinically relevant [[id number]]  (Normal) Collected: 01/05/23 2218    Lab Status: Final result Specimen: Nares from Nose Updated: 01/05/23 2347     SARS-CoV-2 Negative     INFLUENZA A PCR Negative     INFLUENZA B PCR Negative     RSV PCR Negative    Narrative:      FOR PEDIATRIC PATIENTS - copy/paste COVID Guidelines URL to browser: [email or web address]  [employer]    SARS-CoV-2 assay is a Nucleic Acid Amplification assay intended for the  qualitative detection of nucleic acid from SARS-CoV-2 in nasopharyngeal  swabs  Results are for the presumptive identification of SARS-CoV-2 RNA  Positive results are indicative of infection with SARS-CoV-2, the virus  causing COVID-19, but do not rule out bacterial infection or co-infection  with other viruses  Laboratories within the [place] and its  territories are required to report all positive results to the appropriate  public health authorities  Negative results do not preclude SARS-CoV-2  infection and should not be used as the sole basis for treatment or other  patient management decisions  Negative results must be combined with  clinical observations, patient history, and epidemiological information  This test has not been FDA cleared or approved  This test has been authorized by FDA under an Emergency Use Authorization  (EUA)   This test is only authorized for the duration of time the  declaration that circumstances exist justifying the authorization of the  emergency use of an in vitro diagnostic tests for detection of SARS-CoV-2  virus and/or diagnosis of COVID-19 infection under section 564(b)(1) of  the Act, 21 U  S C  044ROE-6(T)(7), unless the authorization is terminated  or revoked sooner  The test has been validated but independent review by FDA  and CLIA is pending  Test performed using "[employer] GeneXpert: This RT-PCR assay targets N2,  a region unique to SARS-CoV-2  A conserved region in the E-gene was chosen  for pan-Sarbecovirus detection which includes SARS-CoV-2  According to CMS-2020-01-R, this platform meets the definition of high-throughput technology  No orders to display              Procedures  Procedures         ED Course                                             Medical Decision Making  Will send COVID-19, influenza, RSV test   Explained to patient that test results can take up to 24hrs and she will be contacted with positive  results  Will treat for bronchitis with albuterol inhaler, 5 days of prednisone, and zpak  Discussed follow up with family doctor as well as her OBGYN doing surgery next week as her illness/medications may impact the surgery  Discussed other supportive care  Discussed strict return precautions if symptoms worsen or new symptoms arise  Patient states understanding and agrees with plan  Acute bronchitis: acute illness or injury  URI (upper respiratory infection): acute illness or injury  Amount and/or Complexity of Data Reviewed  Independent Historian:      Details: Patient and significant other are historians  External Data Reviewed: notes  Labs: ordered  Decision-making details documented in ED Course  Risk  OTC drugs  Prescription drug management            Disposition  Final diagnoses:   Acute bronchitis   URI (upper respiratory infection)     Time reflects when diagnosis was documented in both MDM as applicable and the Disposition within this note     Time User Action Codes Description Comment    1/5/2023 10:12 PM Hermes Conway Add [J20 9] Acute bronchitis     1/5/2023 10:12 PM Newton Cardenas Covert Add [J06 9] URI (upper respiratory infection) ED Disposition     ED Disposition   Discharge    Condition   Stable    Date/Time   Thu Jan 5, 2023 10:09 PM    Comment   Nancy Castro discharge to home/self care  Follow-up Information     Follow up With Specialties Details Why Contact Info Additional Information    Greg Celaya MD Family Medicine Schedule an appointment as soon as possible for a visit in 1 day  [address] [place] [address] Emergency Department Emergency Medicine  If symptoms worsen [facility] [id number]  [address] [facility] Emergency Department, [address] [facility] , [place], [place], [address]          Discharge Medication List as of 1/5/2023 10:49 PM      START taking these medications    Details   azithromycin (ZITHROMAX) 250 mg tablet tablet daily x 4 days Do not start before January 6, 2023 , Normal      predniSONE 50 mg tablet Take 1 tablet (50 mg total) by mouth daily Do not start before January 6, 2023 , Starting Fri 1/6/2023, Normal         CONTINUE these medications which have NOT CHANGED    Details   busPIRone (BUSPAR) 30 MG tablet Take 30 mg by mouth 2 (two) times a day, Historical Med      Doxycycline 100 mg caps- SANE, [facility] 7 DAY SUPPLY BOTTLE, SENT WITH PATIENT (VIBRAMYCIN) Take 1 Bottle by mouth once  Take one capsule by mouth twice a day until finished , Starting Thu 2/24/2022, Historical Med      methocarbamol (ROBAXIN) 500 mg tablet TAKE 1 TABLET BY MOUTH 4 TIMES A DAY AS NEEDED FOR MUSCLE SPASMS , Historical Med      nystatin (MYCOSTATIN) powder Apply topically 2 (two) times a day, Starting Mon 6/6/2022, Normal      risperiDONE (RisperDAL) 1 mg tablet Take 1 25 mg by mouth daily, Historical Med      Vyvanse 50 MG capsule TAKE ONE CAPSULE BY MOUTH EVERY DAY fill ON 2-9-22, Historical Med             No discharge procedures on file      PDMP Review     None          ED Provider  Electronically Signed by           Gin Ernandez PA-C  01/05/23 2957

## 2023-01-11 ENCOUNTER — APPOINTMENT (OUTPATIENT)
Dept: RADIOLOGY | Facility: CLINIC | Age: 23
End: 2023-01-11

## 2023-01-11 ENCOUNTER — OFFICE VISIT (OUTPATIENT)
Dept: URGENT CARE | Facility: CLINIC | Age: 23
End: 2023-01-11

## 2023-01-11 VITALS
DIASTOLIC BLOOD PRESSURE: 80 MMHG | OXYGEN SATURATION: 96 % | TEMPERATURE: 96 F | SYSTOLIC BLOOD PRESSURE: 128 MMHG | RESPIRATION RATE: 20 BRPM | HEART RATE: 92 BPM

## 2023-01-11 DIAGNOSIS — Z76.89 ENCOUNTER TO ESTABLISH CARE: ICD-10-CM

## 2023-01-11 DIAGNOSIS — R06.89 ABNORMAL BREATH SOUNDS: ICD-10-CM

## 2023-01-11 DIAGNOSIS — J20.9 ACUTE BRONCHITIS, UNSPECIFIED ORGANISM: Primary | ICD-10-CM

## 2023-01-11 RX ORDER — LORAZEPAM 2 MG/1
1 TABLET ORAL AS NEEDED
COMMUNITY
Start: 2023-01-05

## 2023-01-11 RX ORDER — LEVALBUTEROL INHALATION SOLUTION 0.63 MG/3ML
0.63 SOLUTION RESPIRATORY (INHALATION) ONCE
Status: COMPLETED | OUTPATIENT
Start: 2023-01-11 | End: 2023-01-11

## 2023-01-11 RX ORDER — PREDNISONE 20 MG/1
TABLET ORAL
Qty: 10 TABLET | Refills: 0 | Status: SHIPPED | OUTPATIENT
Start: 2023-01-11

## 2023-01-11 RX ORDER — FLUCONAZOLE 150 MG/1
150 TABLET ORAL ONCE
COMMUNITY
Start: 2023-01-06

## 2023-01-11 RX ORDER — BROMPHENIRAMINE MALEATE, PSEUDOEPHEDRINE HYDROCHLORIDE, AND DEXTROMETHORPHAN HYDROBROMIDE 2; 30; 10 MG/5ML; MG/5ML; MG/5ML
5 SYRUP ORAL 4 TIMES DAILY PRN
COMMUNITY
Start: 2023-01-05

## 2023-01-11 RX ORDER — AMOXICILLIN 875 MG/1
875 TABLET, COATED ORAL 2 TIMES DAILY
Qty: 20 TABLET | Refills: 0 | Status: SHIPPED | OUTPATIENT
Start: 2023-01-11 | End: 2023-01-21

## 2023-01-11 RX ADMIN — LEVALBUTEROL INHALATION SOLUTION 0.63 MG: 0.63 SOLUTION RESPIRATORY (INHALATION) at 14:22

## 2023-01-11 NOTE — LETTER
January 11, 2023     Patient: Ness Esposito   YOB: 2000   Date of Visit: 1/11/2023       To Whom It May Concern:    Patient was seen in office today for ongoing medical ailment  May attempt return to work in the next 2 to 4 days as tolerated           Sincerely,        Jacob Yañez PA-C    CC: No Recipients

## 2023-01-11 NOTE — PATIENT INSTRUCTIONS
Please get established with a primary care provider as soon as possible  Referral placed into the [facility] system for family practice  Call [facility]'s information link phone number to inquire about getting set up with primary care provider  Take antibiotic as instructed  Take prednisone as instructed  While on prednisone do not take any ibuprofen or ibuprofen like products  May safely take Tylenol if needed  If significant worsening of your symptoms proceed immediately to emergency room for further evaluation

## 2023-01-11 NOTE — PROGRESS NOTES
[facility]    NAME: Cassie Blackburn is a 25 y o  female  : 2000    MRN: [id number]  DATE: 2023  TIME: 2:12 PM    Assessment and Plan   Acute bronchitis, unspecified organism [J20 9]  1  Acute bronchitis, unspecified organism  levalbuterol (XOPENEX) inhalation solution 0 63 mg    Mini neb    predniSONE 20 mg tablet    amoxicillin (AMOXIL) 875 mg tablet    Ambulatory Referral to [facility]      2  Abnormal breath sounds  XR chest pa & lateral    Ambulatory Referral to [facility]      3  Encounter to establish care  Ambulatory Referral to [facility]        Provider set up nebulizer machine, placed lev albuterol into nebulizer unit and instructed patient on use  Patient stated she did not notice any improvement with her breathing and nebulizer made her cough more  Post nebulizer treatment pulse ox = 9%  Post nebulizer pulse rate =   92  Holguin walk pulse ox =   96%  Holguin walk pulse rate  92  Chest x-ray: No acute infiltrates  Await radiologist final test results  40 minutes spent on visit between review of patient's past medical information, history, physical, neb treatment, review of chest x-ray results and coordination of care  Formation given on surrounding primary care offices  Information on [facility] phone number also given  Information given on [facility] family practice providers  Patient Instructions   Patient Instructions   Please get established with a primary care provider as soon as possible  Referral placed into the [facility] for family practice  Call [facility] phone number to inquire about getting set up with primary care provider  Take antibiotic as instructed  Take prednisone as instructed  While on prednisone do not take any ibuprofen or ibuprofen like products  May safely take Tylenol if needed      If significant worsening of your symptoms proceed immediately to emergency room for further evaluation  Chief Complaint     Chief Complaint   Patient presents with   • Cough     Pt C/O cough for about 2 wks, ED 01/05/23, DX bronchitis with covid, flu and RSV negative  Flu A in dec  History of Present Illness   Joshua Perez presents to the clinic c/o  27-year-old female comes in with complaint of coughing spells, tightness in her chest   She no longer is having a fever and chills but continues to have a lot of runny nose nasal congestion postnasal drip and coughing  She has been using albuterol inhaler several times a day with minimal relief  She is supposed to have surgery tomorrow and has a call into her surgeon's office but has not heard back from them  Started: January 2, 2023 with fever chills increased coughing head congestion body aches and pains runny nose and chest discomfort  Denies any history of asthma or pneumonia  Did have influenza right before Fritz  Never felt like she really got over that  She was seen at emergency room on January 5, 2023 for URI symptoms  Had flu RSV and COVID testing done that was negative  She was sent home on Z-Kulwant, albuterol inhaler and daily prednisone  She does not smoke nicotine products but she does have medical marijuana that she vapes  She has decreased use of that due to chest discomfort  Review of Systems   Review of Systems   Constitutional: Positive for activity change, appetite change, diaphoresis and fatigue  Negative for chills and fever  No fever or chills at this point but says she still gets some diaphoresis  HENT: Positive for congestion, postnasal drip and rhinorrhea  Negative for sinus pressure and sinus pain  Respiratory: Positive for cough, chest tightness and wheezing  Cardiovascular: Positive for chest pain  Negative for palpitations and leg swelling          Chest pain with cough       Current Medications     Long-Term Medications   Medication Sig Dispense Refill   • busPIRone (BUSPAR) 30 MG tablet Take 30 mg by mouth 2 (two) times a day (Patient not taking: Reported on 6/6/2022)     • LORazepam (ATIVAN) 2 mg tablet Take 1 tablet by mouth as needed (Patient not taking: Reported on 1/11/2023)     • methocarbamol (ROBAXIN) 500 mg tablet TAKE 1 TABLET BY MOUTH 4 TIMES A DAY AS NEEDED FOR MUSCLE SPASMS  (Patient not taking: Reported on 6/7/2022)     • nystatin (MYCOSTATIN) powder Apply topically 2 (two) times a day (Patient not taking: Reported on 7/27/2022) 30 g 0   • risperiDONE (RisperDAL) 1 mg tablet Take 1 25 mg by mouth daily (Patient not taking: Reported on 6/7/2022)     • Vyvanse 50 MG capsule          Current Allergies     Allergies as of 01/11/2023 - Reviewed 01/11/2023   Allergen Reaction Noted   • Diphenhydramine Other (See Comments) 09/20/2021   • Gluten meal - food allergy Diarrhea, GI Intolerance, and Other (See Comments) 04/01/2014          The following portions of the patient's history were reviewed and updated as appropriate: allergies, current medications, past family history, past medical history, past social history, past surgical history and problem list   Past Medical History:   Diagnosis Date   • Anxiety    • Autism    • Concussion 2018    Tripped over recliner     Past Surgical History:   Procedure Laterality Date   • INSERTION OF INTRAUTERINE DEVICE (IUD)  03/2019     Family History   Problem Relation Age of Onset   • Breast cancer Neg Hx    • Uterine cancer Neg Hx    • Ovarian cancer Neg Hx    • Colon cancer Neg Hx        Objective   /80 (BP Location: Left arm, Patient Position: Sitting, Cuff Size: Standard)   Pulse 82   Temp (!) 96 °F (35 6 °C) (Tympanic)   Resp 20   LMP  (LMP Unknown)   SpO2 98%   No LMP recorded (lmp unknown)  Patient has had an implant  Physical Exam     Physical Exam  Vitals and nursing note reviewed  Constitutional:       General: She is not in acute distress  Appearance: She is well-developed   She is ill-appearing  She is not toxic-appearing or diaphoretic  Comments: Appears mildly ill but in no acute distress  No trismus or conversational dyspnea  Accompanied by fiancé  HENT:      Head: Normocephalic and atraumatic  Right Ear: Tympanic membrane, ear canal and external ear normal       Left Ear: Tympanic membrane, ear canal and external ear normal       Nose: Congestion present  No rhinorrhea  Comments: Red boggy nasal turbinates     Mouth/Throat:      Mouth: Mucous membranes are moist       Pharynx: Posterior oropharyngeal erythema present  No oropharyngeal exudate  Comments: Cobblestoning posterior pharynx with patchy redness  Eyes:      General:         Right eye: No discharge  Left eye: No discharge  Conjunctiva/sclera: Conjunctivae normal       Pupils: Pupils are equal, round, and reactive to light  Cardiovascular:      Rate and Rhythm: Normal rate and regular rhythm  Heart sounds: Normal heart sounds  No murmur heard  No friction rub  No gallop  Pulmonary:      Effort: Pulmonary effort is normal  No respiratory distress  Breath sounds: No stridor  Wheezing present  No rhonchi or rales  Comments: Wheezes bilateral bases  Cough with deep breath  Musculoskeletal:      Cervical back: Normal range of motion and neck supple  No rigidity or tenderness  Lymphadenopathy:      Cervical: No cervical adenopathy  Skin:     General: Skin is warm and dry  Coloration: Skin is not jaundiced or pale  Findings: No rash  Neurological:      Mental Status: She is alert and oriented to person, place, and time     Psychiatric:         Mood and Affect: Mood normal          Behavior: Behavior normal

## 2023-01-17 VITALS
SYSTOLIC BLOOD PRESSURE: 108 MMHG | RESPIRATION RATE: 18 BRPM | HEART RATE: 75 BPM | DIASTOLIC BLOOD PRESSURE: 75 MMHG | HEIGHT: 67 IN | OXYGEN SATURATION: 99 % | TEMPERATURE: 98.2 F | WEIGHT: 169.97 LBS | BODY MASS INDEX: 26.68 KG/M2

## 2023-01-18 ENCOUNTER — HOSPITAL ENCOUNTER (EMERGENCY)
Facility: HOSPITAL | Age: 23
Discharge: HOME | End: 2023-01-18
Attending: EMERGENCY MEDICINE

## 2023-01-18 DIAGNOSIS — K11.20 SIALOADENITIS: ICD-10-CM

## 2023-01-18 DIAGNOSIS — K11.20 PAROTITIS: Primary | ICD-10-CM

## 2023-01-18 NOTE — ED PROVIDER NOTES
History  Chief Complaint   Patient presents with   • Dental Pain     Pt states upper and lower left sided dental pain radiating into neck since today  States hurts to swallow and difficulty swallowing  Airway patent  This is an otherwise healthy 28-year-old female who presents with left-sided facial and neck pain  At around 5 PM, patient started to experience sudden onset left lower dental pain and left neck pain  No dentalgia previous to this  She took Motrin without relief  She also admits to odynophagia  Prior to Admission Medications   Prescriptions Last Dose Informant Patient Reported? [facility]? [facility], SENT WITH PATIENT (VIBRAMYCIN)   Yes No   Sig: Take 1 Bottle by mouth once  Take one capsule by mouth twice a day until finished  Patient not taking: Reported on 6/7/2022   LORazepam (ATIVAN) 2 mg tablet   Yes No   Sig: Take 1 tablet by mouth as needed   Patient not taking: Reported on 1/11/2023   Vyvanse 50 MG capsule   Yes No   amoxicillin (AMOXIL) 875 mg tablet   No No   Sig: Take 1 tablet (875 mg total) by mouth 2 (two) times a day for 10 days   brompheniramine-pseudoephedrine-DM 30-2-10 MG/5ML syrup   Yes No   Sig: Take 5 mL by mouth 4 (four) times a day as needed   Patient not taking: Reported on 1/11/2023   busPIRone (BUSPAR) 30 MG tablet   Yes No   Sig: Take 30 mg by mouth 2 (two) times a day   Patient not taking: Reported on 6/6/2022   fluconazole (DIFLUCAN) 150 mg tablet   Yes No   Sig: Take 150 mg by mouth once   Patient not taking: Reported on 1/11/2023   methocarbamol (ROBAXIN) 500 mg tablet   Yes No   Sig: TAKE 1 TABLET BY MOUTH 4 TIMES A DAY AS NEEDED FOR MUSCLE SPASMS  Patient not taking: Reported on 6/7/2022   nystatin (MYCOSTATIN) powder   No No   Sig: Apply topically 2 (two) times a day   Patient not taking: Reported on 7/27/2022   predniSONE 20 mg tablet   No No   Sig: One po bid x 5 days    Take with food   risperiDONE (RisperDAL) 1 mg tablet   Yes No   Sig: Take 1 25 mg by mouth daily   Patient not taking: Reported on 6/7/2022      Facility-Administered Medications: None       Past Medical History:   Diagnosis Date   • Anxiety    • Autism    • Concussion 2018    Tripped over recliner       Past Surgical History:   Procedure Laterality Date   • INSERTION OF INTRAUTERINE DEVICE (IUD)  03/2019       Family History   Problem Relation Age of Onset   • Breast cancer Neg Hx    • Uterine cancer Neg Hx    • Ovarian cancer Neg Hx    • Colon cancer Neg Hx      I have reviewed and agree with the history as documented  E-Cigarette/Vaping   • E-Cigarette Use Former User      E-Cigarette/Vaping Substances   • Nicotine No    • THC No    • CBD Yes    • Flavoring No    • Other No    • Unknown No      Social History     Tobacco Use   • Smoking status: Never   • Smokeless tobacco: Never   Vaping Use   • Vaping Use: Former   • Substances: CBD   Substance Use Topics   • Alcohol use: Not Currently     Comment: social   • Drug use: Yes     Types: Marijuana     Comment: medical marijuana       Review of Systems   Constitutional: Negative for chills and fever  HENT: Positive for dental problem, facial swelling and sore throat  Negative for rhinorrhea and trouble swallowing  Eyes: Negative for photophobia and visual disturbance  Respiratory: Negative for cough, chest tightness and shortness of breath  Cardiovascular: Negative for chest pain, palpitations and leg swelling  Gastrointestinal: Negative for abdominal pain, blood in stool, diarrhea, nausea and vomiting  Endocrine: Negative for polyuria  Genitourinary: Negative for dysuria, flank pain, hematuria, vaginal bleeding and vaginal discharge  Musculoskeletal: Negative for back pain and neck pain  Skin: Negative for color change and rash  Allergic/Immunologic: Negative for immunocompromised state     Neurological: Negative for dizziness, weakness, light-headedness, numbness and headaches  All other systems reviewed and are negative  Physical Exam  Physical Exam  Vitals and nursing note reviewed  Constitutional:       General: She is not in acute distress  Appearance: She is well-developed  HENT:      Head: Normocephalic and atraumatic  Comments: Subtle swelling over the angle of the left mandible with overlying tenderness  No erythema or warmth  No trismus  Mouth/Throat:      Lips: Pink  Mouth: Mucous membranes are moist       Dentition: Normal dentition  No dental tenderness, gingival swelling, dental caries or dental abscesses  Pharynx: Oropharynx is clear  Uvula midline  No pharyngeal swelling, oropharyngeal exudate, posterior oropharyngeal erythema or uvula swelling  Tonsils: No tonsillar exudate or tonsillar abscesses  Eyes:      General: Lids are normal       Extraocular Movements: Extraocular movements intact  Conjunctiva/sclera: Conjunctivae normal    Cardiovascular:      Rate and Rhythm: Normal rate and regular rhythm  Pulmonary:      Effort: Pulmonary effort is normal  No tachypnea  Abdominal:      General: Abdomen is flat  There is no distension  Musculoskeletal:         General: No swelling  Cervical back: Full passive range of motion without pain, normal range of motion and neck supple  Lymphadenopathy:      Cervical: Cervical adenopathy (left sided) present  Skin:     General: Skin is warm  Capillary Refill: Capillary refill takes less than 2 seconds  Neurological:      General: No focal deficit present  Mental Status: She is alert     Psychiatric:         Mood and Affect: Mood normal          Speech: Speech normal          Behavior: Behavior normal          Vital Signs  ED Triage Vitals [01/17/23 2302]   Temperature Pulse Respirations Blood Pressure SpO2   98 2 °F (36 8 °C) 75 18 108/75 99 %      Temp Source Heart Rate Source Patient Position - Orthostatic VS BP Location FiO2 (%)   Oral Monitor Sitting Right arm --      Pain Score       --           Vitals:    01/17/23 2302   BP: 108/75   Pulse: 75   Patient Position - Orthostatic VS: Sitting         Visual Acuity      ED Medications  Medications - No data to display    Diagnostic Studies  Results Reviewed     None                 No orders to display              Procedures  Procedures         ED Course                                             Medical Decision Making  Symptoms and physical exam consistent with parotitis  Most likely obstructive given sudden onset  No evidence of peritonsillar abscess, pharyngitis, dental abscess  Educated the patient on the treatment which is essentially symptomatic treatment  Warm compresses, sialagogues, NSAIDs  Instructed the patient to call ENT in the morning for follow-up  Patient given strict return precautions for evolution of parotitis to infection  Patient in agreement with plan  Parotitis: acute illness or injury  Sialoadenitis: acute illness or injury      Disposition  Final diagnoses:   Parotitis   Sialoadenitis     Time reflects when diagnosis was documented in both MDM as applicable and the Disposition within this note     Time User Action Codes Description Comment    1/18/2023  1:44 AM Brionna MEHTA Add [K11 20] Parotitis     1/18/2023  1:45 AM Sigurd Alpers Add [K11 20] Sialoadenitis       ED Disposition     ED Disposition   Discharge    Condition   Stable    Date/Time   Wed Jan 18, 2023  1:44 AM    Comment   Nancy Castro discharge to home/self care                 Follow-up Information     Follow up With Specialties Details Why Contact Info Additional [address] [facility] Ot[facility] Schedule an appointment as soon as possible for a visit   [address] [place] [address] St  [address] [facility] [phone]  [address] [facility], [address] [place] [address],[address] Floor, [place], [place] [address]    [facility] Emergency Medicine Go to  If symptoms worsen [facility] [id number]  [facility] Emergency Department, [address]  , [place], [place], [address]          Discharge Medication List as of 1/18/2023  1:46 AM      CONTINUE these medications which have NOT CHANGED    Details   amoxicillin (AMOXIL) 875 mg tablet Take 1 tablet (875 mg total) by mouth 2 (two) times a day for 10 days, Starting Wed 1/11/2023, Until Sat 1/21/2023, Normal      brompheniramine-pseudoephedrine-DM 30-2-10 MG/5ML syrup Take 5 mL by mouth 4 (four) times a day as needed, Starting Thu 1/5/2023, Historical Med      busPIRone (BUSPAR) 30 MG tablet Take 30 mg by mouth 2 (two) times a day, Historical Med      Doxycycline 100 mg caps- [facility] 7 DAY SUPPLY BOTTLE, SENT WITH PATIENT (VIBRAMYCIN) Take 1 Bottle by mouth once  Take one capsule by mouth twice a day until finished , Starting Thu 2/24/2022, Historical Med      fluconazole (DIFLUCAN) 150 mg tablet Take 150 mg by mouth once, Starting Fri 1/6/2023, Historical Med      LORazepam (ATIVAN) 2 mg tablet Take 1 tablet by mouth as needed, Starting Thu 1/5/2023, Historical Med      methocarbamol (ROBAXIN) 500 mg tablet TAKE 1 TABLET BY MOUTH 4 TIMES A DAY AS NEEDED FOR MUSCLE SPASMS , Historical Med      nystatin (MYCOSTATIN) powder Apply topically 2 (two) times a day, Starting Mon 6/6/2022, Normal      predniSONE 20 mg tablet One po bid x 5 days  Take with food, Normal      risperiDONE (RisperDAL) 1 mg tablet Take 1 25 mg by mouth daily, Historical Med      Vyvanse 50 MG capsule Historical Med             No discharge procedures on file      PDMP Review     None          ED Provider  Electronically Signed by           Bradford Figueroa MD  01/18/23 7348

## 2023-02-07 ENCOUNTER — APPOINTMENT (OUTPATIENT)
Dept: LAB | Facility: CLINIC | Age: 23
End: 2023-02-07

## 2023-02-07 DIAGNOSIS — Q51.818: ICD-10-CM

## 2023-02-07 LAB
BASOPHILS # BLD AUTO: 0.03 THOUSANDS/ÂΜL (ref 0–0.1)
BASOPHILS NFR BLD AUTO: 0 % (ref 0–1)
EOSINOPHIL # BLD AUTO: 0.22 THOUSAND/ÂΜL (ref 0–0.61)
EOSINOPHIL NFR BLD AUTO: 3 % (ref 0–6)
ERYTHROCYTE [DISTWIDTH] IN BLOOD BY AUTOMATED COUNT: 12.6 % (ref 11.6–15.1)
HCT VFR BLD AUTO: 36.5 % (ref 34.8–46.1)
HGB BLD-MCNC: 11.6 G/DL (ref 11.5–15.4)
IMM GRANULOCYTES # BLD AUTO: 0.01 THOUSAND/UL (ref 0–0.2)
IMM GRANULOCYTES NFR BLD AUTO: 0 % (ref 0–2)
LYMPHOCYTES # BLD AUTO: 2.59 THOUSANDS/ÂΜL (ref 0.6–4.47)
LYMPHOCYTES NFR BLD AUTO: 36 % (ref 14–44)
MCH RBC QN AUTO: 27.8 PG (ref 26.8–34.3)
MCHC RBC AUTO-ENTMCNC: 31.8 G/DL (ref 31.4–37.4)
MCV RBC AUTO: 88 FL (ref 82–98)
MONOCYTES # BLD AUTO: 0.48 THOUSAND/ÂΜL (ref 0.17–1.22)
MONOCYTES NFR BLD AUTO: 7 % (ref 4–12)
NEUTROPHILS # BLD AUTO: 3.89 THOUSANDS/ÂΜL (ref 1.85–7.62)
NEUTS SEG NFR BLD AUTO: 54 % (ref 43–75)
NRBC BLD AUTO-RTO: 0 /100 WBCS
PLATELET # BLD AUTO: 396 THOUSANDS/UL (ref 149–390)
PMV BLD AUTO: 10 FL (ref 8.9–12.7)
RBC # BLD AUTO: 4.17 MILLION/UL (ref 3.81–5.12)
WBC # BLD AUTO: 7.22 THOUSAND/UL (ref 4.31–10.16)

## 2023-02-14 ENCOUNTER — OFFICE VISIT (OUTPATIENT)
Dept: OBGYN CLINIC | Facility: MEDICAL CENTER | Age: 23
End: 2023-02-14

## 2023-02-14 VITALS
BODY MASS INDEX: 26.37 KG/M2 | DIASTOLIC BLOOD PRESSURE: 80 MMHG | HEIGHT: 67 IN | SYSTOLIC BLOOD PRESSURE: 120 MMHG | WEIGHT: 168 LBS

## 2023-02-14 DIAGNOSIS — B37.9 CANDIDIASIS: ICD-10-CM

## 2023-02-14 DIAGNOSIS — N89.8 VAGINAL DISCHARGE: Primary | ICD-10-CM

## 2023-02-14 LAB
BV WHIFF TEST VAG QL: ABNORMAL
CLUE CELLS SPEC QL WET PREP: ABNORMAL
SL AMB POCT WET MOUNT: ABNORMAL
T VAGINALIS VAG QL WET PREP: ABNORMAL
YEAST VAG QL WET PREP: ABNORMAL

## 2023-02-14 RX ORDER — ESTRADIOL 1 MG/1
TABLET ORAL
COMMUNITY
Start: 2023-01-26

## 2023-02-14 RX ORDER — FLUCONAZOLE 150 MG/1
150 TABLET ORAL ONCE
Qty: 1 TABLET | Refills: 0 | Status: SHIPPED | OUTPATIENT
Start: 2023-02-14 | End: 2023-02-14

## 2023-02-14 NOTE — PROGRESS NOTES
[facility] [address] [place] [address]    Assessment/Plan:  No problem-specific Assessment & Plan notes found for this encounter  Diagnoses and all orders for this visit:    Vaginal discharge  -     fluconazole (DIFLUCAN) 150 mg tablet; Take 1 tablet (150 mg total) by mouth once for 1 dose  -     POCT wet mount    Candidiasis  -     fluconazole (DIFLUCAN) 150 mg tablet; Take 1 tablet (150 mg total) by mouth once for 1 dose  -     POCT wet mount    Other orders  -     estradiol (ESTRACE) 1 mg tablet; TAKE 2 TABLETS BY MOUTH 2 TIMES A DAY FOR 28 DAYS  Subjective:   Bella Peterson is a 25 y o  [place] female  CC: vaginal discharge    HPI: HPI   Patient presents with complaints of vaginal discharge and itching  She recently had septum resected at \[facility]\"" [facility]  Has history of vaginosis as well  Denies vaginal odor    ROS: Review of Systems   Constitutional: Negative  HENT: Negative  Eyes: Negative  Respiratory: Negative  Cardiovascular: Negative  Gastrointestinal: Negative  Genitourinary: Positive for vaginal discharge  Musculoskeletal: Negative  All other systems reviewed and are negative  PFSH: The following portions of the patient's history were reviewed and updated as appropriate: allergies, current medications, past family history, past medical history, obstetric history, gynecologic history, past social history, past surgical history and problem list        Objective:  /80 (BP Location: Left arm)   Ht 5' 7" (1 702 m)   Wt 76 2 kg (168 lb)   BMI 26 31 kg/m²    Physical Exam  Vitals reviewed  Constitutional:       Appearance: Normal appearance  Cardiovascular:      Rate and Rhythm: Normal rate  Pulmonary:      Effort: Pulmonary effort is normal  No respiratory distress  Genitourinary:     General: Normal vulva  Exam position: Lithotomy position  Labia:         Right: No lesion  Left: No lesion         Vagina: Normal       Cervix: Normal       Uterus: Normal     Neurological:      Mental Status: She is alert     Psychiatric:         Mood and Affect: Mood normal          Behavior: Behavior normal

## 2023-02-17 ENCOUNTER — TELEPHONE (OUTPATIENT)
Dept: OBGYN CLINIC | Facility: MEDICAL CENTER | Age: 23
End: 2023-02-17

## 2023-02-17 DIAGNOSIS — B37.9 CANDIDIASIS: Primary | ICD-10-CM

## 2023-02-17 RX ORDER — FLUCONAZOLE 150 MG/1
150 TABLET ORAL ONCE
Qty: 1 TABLET | Refills: 0 | Status: SHIPPED | OUTPATIENT
Start: 2023-02-17 | End: 2023-02-17

## 2023-02-23 ENCOUNTER — TELEPHONE (OUTPATIENT)
Dept: OBGYN CLINIC | Facility: MEDICAL CENTER | Age: 23
End: 2023-02-23

## 2023-02-23 NOTE — TELEPHONE ENCOUNTER
Pt called in stated she was prescribed Diflucan for a second time to help with symptoms and still seems to not help  Pt would like to know if anything else can be prescribed to help or recommendations in the meantime   Please advise thank you

## 2023-02-24 DIAGNOSIS — B37.31 VAGINAL YEAST INFECTION: Primary | ICD-10-CM

## 2023-03-01 NOTE — PROGRESS NOTES
[facility] - Outpatient Follow-up Note  Nancy Castro 22 y o  female MRN: [id number]  Encounter: [id number]          ASSESSMENT AND PLAN:    Laurel Gtz is a 25 y o  female who presents with a family history of celiac disease, abnormal stools including occasional constipation, elevated LFTs, here for follow-up  She has been having some RUQ abdominal pain, possible exacerbated by alcohol  Prior CRP 1 6   DQ 2 positive, DQ 8 negative  Prior abdominal ultrasound with mild hepatic steatosis and gallbladder sludge  Lurene Model FibroSure with no fibrosis, borderline or probable Menendez, mild steatosis  Most recent CBC with platelets of 388 but otherwise normal   CMP normal   Celiac serologies negative  Smooth muscle antibody, AMA, SUMMER negative  Hepatitis serologies negative  Constipation likely chronic idiopathic constipation versus IBS versus pelvic dyssynergia versus a combination of the above    1  Right upper quadrant abdominal pain    2  Fatty liver    3  Gallbladder sludge    4  Elevated LFTs    5  Constipation, unspecified constipation type        Orders Placed This Encounter   Procedures   • H  pylori antigen, stool   • US abdomen limited   • Comprehensive metabolic panel     We will repeat your liver enzymes  We will check 1 stool test  We will get another ultrasound  Try to limit all alcohol and soda for the next 6 weeks  Try to drink at least 8 cups of water per day  Add Benefiber  MiraLAX as needed if no bowel movement for 2-3 days    ______________________________________________________________________    SUBJECTIVE:    Laurel Gtz is a 25 y o  female who presents with complaint of fatty liver  She has been drinking more alcohol then she would want to  It does not hurt now but on and off  Mostly on the right side  Other than that she is okay  This week a few times  It can last for an hour or more  It is worse with alcohol  Nothing makes it better     No heartburn, dysphagia, odynophagia  Occasional nausea but no vomiting  She can go a week without a BM  That is not associated with a BM  It does not bother you  She does not take anything to help her go  No BRBPR, melena  Perhaps intentional weight loss of 12 lbs  REVIEW OF SYSTEMS IS OTHERWISE NEGATIVE  10 point ROS reviewed and negative, except as above      Historical Information   Past Medical History:   Diagnosis Date   • Anxiety    • Autism    • Concussion 2018    Tripped over recliner     Past Surgical History:   Procedure Laterality Date   • INSERTION OF INTRAUTERINE DEVICE (IUD)  03/2019     Social History   Social History     Substance and Sexual Activity   Alcohol Use Not Currently    Comment: social     Social History     Substance and Sexual Activity   Drug Use Yes   • Types: Marijuana    Comment: medical marijuana     Social History     Tobacco Use   Smoking Status Never   Smokeless Tobacco Never     Family History   Problem Relation Age of Onset   • Breast cancer Neg Hx    • Uterine cancer Neg Hx    • Ovarian cancer Neg Hx    • Colon cancer Neg Hx        Meds/Allergies     No current outpatient medications on file  Allergies   Allergen Reactions   • Diphenhydramine Other (See Comments)           Objective     Blood pressure 113/73, pulse 86, temperature 98 8 °F (37 1 °C), temperature source Tympanic, height 5' 7 5" (1 715 m), weight 76 4 kg (168 lb 6 4 oz), SpO2 98 %, not currently breastfeeding  Body mass index is 25 99 kg/m²  PHYSICAL EXAMINATION:    General Appearance:   Alert, cooperative, no distress   HEENT:  Normocephalic, atraumatic, anicteric  Neck supple, symmetrical, trachea midline  Lungs:   Equal chest rise and unlabored breathing, normal effort, no coughing  Cardiovascular:   No visualized JVD  Abdomen:   No abdominal distension  Skin:   No jaundice, rashes, or lesions  Musculoskeletal:   Normal range of motion visualized  Psych:  Normal affect and normal insight  Neuro:  Alert and appropriate  Lab Results:   No visits with results within 1 Day(s) from this visit  Latest known visit with results is:   Office Visit on 02/14/2023   Component Date Value   • WET MOUNT 02/14/2023 neg    • Yeast, Wet Prep 02/14/2023 pos    • Whiff Test 02/14/2023 neg    • Clue Cells 02/14/2023 neg    • Trich, Wet Prep 02/14/2023 neg        Lab Results   Component Value Date    WBC 7 22 02/07/2023    HGB 11 6 02/07/2023    HCT 36 5 02/07/2023    MCV 88 02/07/2023     (H) 02/07/2023       Lab Results   Component Value Date    SODIUM 138 06/07/2022    K 3 8 06/07/2022     06/07/2022    CO2 26 06/07/2022    AGAP 5 06/07/2022    BUN 10 06/07/2022    CREATININE 0 65 06/07/2022    GLUC 81 06/07/2022    GLUC 80 06/07/2022    CALCIUM 9 6 06/07/2022    AST 32 06/07/2022    AST 38 06/07/2022    ALT 56 06/07/2022    ALT 47 (H) 06/07/2022    ALKPHOS 96 06/07/2022    TP 8 2 06/07/2022    TBILI 0 32 06/07/2022    EGFR 127 06/07/2022       No results found for: CRP    No results found for: [id number], TSH    No results found for: IRON, TIBC, FERRITIN    Radiology Results:   No results found

## 2023-03-02 ENCOUNTER — OFFICE VISIT (OUTPATIENT)
Dept: GASTROENTEROLOGY | Facility: CLINIC | Age: 23
End: 2023-03-02

## 2023-03-02 VITALS
HEIGHT: 68 IN | TEMPERATURE: 98.8 F | OXYGEN SATURATION: 98 % | HEART RATE: 86 BPM | BODY MASS INDEX: 25.52 KG/M2 | SYSTOLIC BLOOD PRESSURE: 113 MMHG | WEIGHT: 168.4 LBS | DIASTOLIC BLOOD PRESSURE: 73 MMHG

## 2023-03-02 DIAGNOSIS — R79.89 ELEVATED LFTS: ICD-10-CM

## 2023-03-02 DIAGNOSIS — K76.0 FATTY LIVER: ICD-10-CM

## 2023-03-02 DIAGNOSIS — K59.00 CONSTIPATION, UNSPECIFIED CONSTIPATION TYPE: ICD-10-CM

## 2023-03-02 DIAGNOSIS — R10.11 RIGHT UPPER QUADRANT ABDOMINAL PAIN: Primary | ICD-10-CM

## 2023-03-02 DIAGNOSIS — K82.8 GALLBLADDER SLUDGE: ICD-10-CM

## 2023-03-02 RX ORDER — FLUCONAZOLE 150 MG/1
150 TABLET ORAL ONCE
COMMUNITY
Start: 2023-02-17 | End: 2023-03-02

## 2023-03-02 NOTE — PATIENT INSTRUCTIONS
We will repeat your liver enzymes  We will check 1 stool test  We will get another ultrasound  Try to limit all alcohol and soda for the next 6 weeks  Try to drink at least 8 cups of water per day  Add Benefiber  MiraLAX as needed if no bowel movement for 2-3 days

## 2023-03-06 ENCOUNTER — APPOINTMENT (OUTPATIENT)
Dept: LAB | Facility: CLINIC | Age: 23
End: 2023-03-06

## 2023-03-06 DIAGNOSIS — R10.11 RIGHT UPPER QUADRANT ABDOMINAL PAIN: ICD-10-CM

## 2023-03-06 DIAGNOSIS — K82.8 GALLBLADDER SLUDGE: ICD-10-CM

## 2023-03-06 DIAGNOSIS — K76.0 FATTY LIVER: Primary | ICD-10-CM

## 2023-03-06 DIAGNOSIS — R79.89 ELEVATED LFTS: ICD-10-CM

## 2023-03-06 LAB
ALBUMIN SERPL BCP-MCNC: 4 G/DL (ref 3.5–5)
ALP SERPL-CCNC: 70 U/L (ref 46–116)
ALT SERPL W P-5'-P-CCNC: 39 U/L (ref 12–78)
ANION GAP SERPL CALCULATED.3IONS-SCNC: 0 MMOL/L (ref 4–13)
AST SERPL W P-5'-P-CCNC: 20 U/L (ref 5–45)
BILIRUB SERPL-MCNC: 0.29 MG/DL (ref 0.2–1)
BUN SERPL-MCNC: 13 MG/DL (ref 5–25)
CALCIUM SERPL-MCNC: 9.6 MG/DL (ref 8.3–10.1)
CHLORIDE SERPL-SCNC: 109 MMOL/L (ref 96–108)
CO2 SERPL-SCNC: 29 MMOL/L (ref 21–32)
CREAT SERPL-MCNC: 0.58 MG/DL (ref 0.6–1.3)
GFR SERPL CREATININE-BSD FRML MDRD: 132 ML/MIN/1.73SQ M
GLUCOSE P FAST SERPL-MCNC: 102 MG/DL (ref 65–99)
POTASSIUM SERPL-SCNC: 4.2 MMOL/L (ref 3.5–5.3)
PROT SERPL-MCNC: 7.3 G/DL (ref 6.4–8.4)
SODIUM SERPL-SCNC: 138 MMOL/L (ref 135–147)

## 2023-03-07 ENCOUNTER — APPOINTMENT (OUTPATIENT)
Dept: LAB | Facility: MEDICAL CENTER | Age: 23
End: 2023-03-07
Attending: INTERNAL MEDICINE

## 2023-03-07 ENCOUNTER — HOSPITAL ENCOUNTER (OUTPATIENT)
Dept: ULTRASOUND IMAGING | Facility: MEDICAL CENTER | Age: 23
Discharge: HOME | End: 2023-03-07
Attending: INTERNAL MEDICINE

## 2023-03-07 DIAGNOSIS — K82.8 GALLBLADDER SLUDGE: ICD-10-CM

## 2023-03-07 DIAGNOSIS — R79.89 ELEVATED LFTS: ICD-10-CM

## 2023-03-07 DIAGNOSIS — R10.11 RIGHT UPPER QUADRANT ABDOMINAL PAIN: ICD-10-CM

## 2023-03-07 DIAGNOSIS — K76.0 FATTY LIVER: ICD-10-CM

## 2023-03-09 LAB — H PYLORI AG STL QL IA: NEGATIVE

## 2023-04-03 ENCOUNTER — TELEPHONE (OUTPATIENT)
Dept: OBGYN CLINIC | Facility: MEDICAL CENTER | Age: 23
End: 2023-04-03

## 2023-04-03 ENCOUNTER — PROCEDURE VISIT (OUTPATIENT)
Dept: OBGYN CLINIC | Facility: MEDICAL CENTER | Age: 23
End: 2023-04-03

## 2023-04-03 VITALS
SYSTOLIC BLOOD PRESSURE: 120 MMHG | DIASTOLIC BLOOD PRESSURE: 78 MMHG | HEIGHT: 68 IN | BODY MASS INDEX: 25.66 KG/M2 | WEIGHT: 169.3 LBS

## 2023-04-03 DIAGNOSIS — Z30.09 ENCOUNTER FOR OTHER GENERAL COUNSELING OR ADVICE ON CONTRACEPTION: ICD-10-CM

## 2023-04-03 DIAGNOSIS — Q51.28 UTERINE SEPTUM: Primary | ICD-10-CM

## 2023-04-03 NOTE — PROGRESS NOTES
Assessment:  25 y o  Annie Sr who presents for IUD replacement s/p uterine septum removal  Recommend SIS prior to placement  Plan:  Diagnoses and all orders for this visit:    Uterine septum  -     US sonohysterogram w or wo doppler; Future  - S/p septum removal  - Scheduling IUD post-assessment    Encounter for other general counseling or advice on contraception  - Task sent to assess coverage for pt for [facility]  - Discussed pre-procedure medication:   - Will send Valium on scheduling   - Home ibuprofen 800mg   - Home zofran 4mg   - All meds 1hr prior to procedure   - Discussed needs  if using valium        __________________________________________________________________    Subjective   Nancy Mandy Myers is a 25 y o  Annie Sr who presents for [facility] IUD placement  Marta Bronwyn removed at time of septum surgery  Was told by MATTHIAS at [facility] that she needed [address] done prior to replacement  IUD and US could not be done by MATTHIAS due to coding/coverage issues  Patient unsure if wants to proceed  Got great menstrual results, but had weight gain with Liletta prior  Used 2019 - 2021 and removed with procedure  Advised on similarity between all levonorgestrel IUD  Discussed poor correlation to weight gain across population studies, but individualized rxn may be possible  She notes she was on additional meds at that time too and might have influenced  Advised kyleena and madhavi are lower dose and physically smaller; may be a better choice for her  Considering same  Advised also on paragard, which may increase flow and dysmenorrhea  This are symptoms she is currently managing with IUD and does not wish to exacerbate  The following portions of the patient's history were reviewed and updated as appropriate: allergies, current medications, past medical history, past social history, past surgical history and problem list     Review of Systems  Review of Systems   Constitutional: Positive for unexpected weight change   Negative "for chills and fever  Respiratory: Negative for shortness of breath  Cardiovascular: Negative for chest pain and palpitations  Gastrointestinal: Negative for abdominal distention, abdominal pain, constipation and diarrhea  Genitourinary: Positive for vaginal bleeding (has menses)  Negative for dysuria, flank pain, menstrual problem, pelvic pain, vaginal discharge and vaginal pain  Skin: Negative for rash and wound  Neurological: Negative for dizziness, light-headedness and headaches  Objective  /78   Ht 5' 7 5\" (1 715 m)   Wt 76 8 kg (169 lb 4 8 oz)   LMP 03/30/2023 (Exact Date)   BMI 26 12 kg/m²      Physical Exam:  Physical Exam  Vitals reviewed  Constitutional:       General: She is not in acute distress  Appearance: Normal appearance  She is not ill-appearing, toxic-appearing or diaphoretic  HENT:      Head: Normocephalic and atraumatic  Eyes:      General: No scleral icterus  Conjunctiva/sclera: Conjunctivae normal    Pulmonary:      Effort: Pulmonary effort is normal  No respiratory distress  Musculoskeletal:         General: No deformity or signs of injury  Skin:     Coloration: Skin is not jaundiced or pale  Findings: No bruising or erythema  Comments: Limited to exposed skin   Neurological:      Mental Status: She is alert  Mental status is at baseline  Psychiatric:         Mood and Affect: Mood normal          Behavior: Behavior normal          Thought Content:  Thought content normal          Judgment: Judgment normal            Reviewed  Hysteroscopy OP report  Intraop US's    "

## 2023-04-03 NOTE — TELEPHONE ENCOUNTER
spoke to [place] at 3:51 Pm at [facility] and was told no PA is needed for [facility] insertion code ,17187 and [id number]

## 2023-04-03 NOTE — TELEPHONE ENCOUNTER
----- Message from Lindsay Mckinley MD sent at 4/3/2023  2:59 PM EDT -----  Regarding: check coverage/need for PA  Nancy will be returning for [facility] IUD placement  Please check coverage and need for PA  Will be scheduling after sonohysto result

## 2023-04-25 ENCOUNTER — OFFICE VISIT (OUTPATIENT)
Dept: OBGYN CLINIC | Facility: MEDICAL CENTER | Age: 23
End: 2023-04-25

## 2023-04-25 VITALS — BODY MASS INDEX: 26.74 KG/M2 | HEIGHT: 67 IN | WEIGHT: 170.4 LBS

## 2023-04-25 DIAGNOSIS — B37.31 YEAST INFECTION INVOLVING THE VAGINA AND SURROUNDING AREA: ICD-10-CM

## 2023-04-25 DIAGNOSIS — B96.89 BV (BACTERIAL VAGINOSIS): Primary | ICD-10-CM

## 2023-04-25 DIAGNOSIS — N76.0 BV (BACTERIAL VAGINOSIS): Primary | ICD-10-CM

## 2023-04-25 RX ORDER — FLUCONAZOLE 150 MG/1
150 TABLET ORAL ONCE
Qty: 1 TABLET | Refills: 0 | Status: SHIPPED | OUTPATIENT
Start: 2023-04-25 | End: 2023-04-25

## 2023-04-25 RX ORDER — FLUCONAZOLE 150 MG/1
150 TABLET ORAL DAILY
Qty: 2 TABLET | Refills: 0 | Status: SHIPPED | OUTPATIENT
Start: 2023-04-25 | End: 2023-04-27

## 2023-04-25 RX ORDER — METRONIDAZOLE 500 MG/1
500 TABLET ORAL EVERY 8 HOURS SCHEDULED
Qty: 15 TABLET | Refills: 0 | Status: SHIPPED | OUTPATIENT
Start: 2023-04-25 | End: 2023-04-30

## 2023-04-25 NOTE — PROGRESS NOTES
Pt reports smelly discharge and itching around the outside  On speculum exam there is noted to be large amount of discharge consistent with BV  Will take flagyl and then diflucan after if needed

## 2023-05-02 ENCOUNTER — OFFICE VISIT (OUTPATIENT)
Dept: URGENT CARE | Facility: CLINIC | Age: 23
End: 2023-05-02

## 2023-05-02 VITALS
OXYGEN SATURATION: 100 % | HEART RATE: 133 BPM | BODY MASS INDEX: 26.68 KG/M2 | DIASTOLIC BLOOD PRESSURE: 65 MMHG | SYSTOLIC BLOOD PRESSURE: 114 MMHG | HEIGHT: 67 IN | TEMPERATURE: 100.7 F | WEIGHT: 170 LBS | RESPIRATION RATE: 16 BRPM

## 2023-05-02 DIAGNOSIS — J02.0 STREP PHARYNGITIS: ICD-10-CM

## 2023-05-02 DIAGNOSIS — J02.9 SORE THROAT: Primary | ICD-10-CM

## 2023-05-02 LAB — S PYO AG THROAT QL: POSITIVE

## 2023-05-02 RX ORDER — ONDANSETRON 4 MG/1
4 TABLET, FILM COATED ORAL EVERY 8 HOURS PRN
COMMUNITY

## 2023-05-02 RX ORDER — AMOXICILLIN 500 MG/1
500 CAPSULE ORAL EVERY 12 HOURS SCHEDULED
Qty: 20 CAPSULE | Refills: 0 | Status: SHIPPED | OUTPATIENT
Start: 2023-05-02 | End: 2023-05-12

## 2023-05-02 NOTE — LETTER
May 2, 2023     Patient: Arnold Colvin   YOB: 2000   Date of Visit: 5/2/2023       To Whom it May Concern:    Bee Almaguer was seen in my clinic on 5/2/2023  She may return to work when fever free for 24 hours without use of fever reducing medication       If you have any questions or concerns, please don't hesitate to call           Sincerely,          DESTINI Meier        CC: No Recipients

## 2023-05-02 NOTE — PATIENT INSTRUCTIONS
Strep A test was positive in the office today  Start antibiotics -change your toothbrush 24 hours after starting antibiotics as bacteria can be reintroduced by what grows on your toothbrush  For sore throat:  Salt water gurgle  Chloraseptic throat spray  Honey  Throat lozenges  OTC pain medication such as Tylenol or Ibuprofen    Follow up with PCP in 3-5 days  Proceed to ER if symptoms worsen

## 2023-05-02 NOTE — PROGRESS NOTES
[facility]        NAME: Daniela Coronado is a 26 y o  female  : 2000    MRN: [id number]  DATE: May 2, 2023  TIME: 9:52 AM    Assessment and Plan   Sore throat [J02 9]  1  Sore throat  POCT rapid strepA      2  Strep pharyngitis  amoxicillin (AMOXIL) 500 mg capsule            Patient Instructions   · Strep A test was positive in the office today   Start antibiotics -change your toothbrush 24 hours after starting antibiotics as bacteria can be reintroduced by what grows on your toothbrush   For sore throat:  o Salt water gurgle  o Chloraseptic throat spray  o Honey  o Throat lozenges  o OTC pain medication such as Tylenol or Ibuprofen    Follow up with PCP in 3-5 days  Proceed to ER if symptoms worsen  Chief Complaint     Chief Complaint   Patient presents with    Vomiting     Patient with vomiting and fever since last night  Has Zofran at home, did not take it  Has a fever, TMAX 102, and body aches         History of Present Illness       Vomiting and fevers since last night  Was at work today and vomited and had fever, Took Advil this morning  No sick contacts she can think of  Review of Systems   Review of Systems   HENT: Positive for sore throat  Negative for congestion and ear pain  Gastrointestinal: Positive for abdominal pain, constipation, nausea and vomiting  Musculoskeletal: Positive for myalgias  Neurological: Negative for dizziness, seizures, weakness and numbness           Current Medications       Current Outpatient Medications:     amoxicillin (AMOXIL) 500 mg capsule, Take 1 capsule (500 mg total) by mouth every 12 (twelve) hours for 10 days, Disp: 20 capsule, Rfl: 0    ondansetron (ZOFRAN) 4 mg tablet, Take 4 mg by mouth every 8 (eight) hours as needed for nausea or vomiting (Patient not taking: Reported on 2023), Disp: , Rfl:     Current Allergies     Allergies as of 2023 - Reviewed 2023   Allergen Reaction Noted    Diphenhydramine Other (See "Comments) 09/20/2021            The following portions of the patient's history were reviewed and updated as appropriate: allergies, current medications, past family history, past medical history, past social history, past surgical history and problem list      Past Medical History:   Diagnosis Date    Anxiety     Autism     Concussion 2018    Tripped over recliner       Past Surgical History:   Procedure Laterality Date    INSERTION OF INTRAUTERINE DEVICE (IUD)  03/2019       Family History   Problem Relation Age of Onset    Breast cancer Neg Hx     Uterine cancer Neg Hx     Ovarian cancer Neg Hx     Colon cancer Neg Hx          Medications have been verified  Objective   /65   Pulse (!) 133   Temp (!) 100 7 °F (38 2 °C) (Tympanic)   Resp 16   Ht 5' 7\" (1 702 m)   Wt 77 1 kg (170 lb)   SpO2 100%   BMI 26 63 kg/m²   No LMP recorded  Patient has had an implant  Physical Exam     Physical Exam  Vitals reviewed  HENT:      Right Ear: Tympanic membrane normal       Left Ear: Tympanic membrane normal       Nose: Nose normal       Mouth/Throat:      Mouth: Mucous membranes are dry  Eyes:      Conjunctiva/sclera: Conjunctivae normal       Pupils: Pupils are equal, round, and reactive to light  Cardiovascular:      Rate and Rhythm: Normal rate  Pulses: Normal pulses  Heart sounds: Normal heart sounds  No murmur heard  No friction rub  No gallop  Pulmonary:      Effort: Pulmonary effort is normal  No respiratory distress  Breath sounds: Normal breath sounds  No stridor  No wheezing, rhonchi or rales  Chest:      Chest wall: No tenderness  Abdominal:      General: There is no distension  Tenderness: There is no abdominal tenderness  There is no right CVA tenderness or left CVA tenderness  Musculoskeletal:         General: Tenderness present  Cervical back: Normal range of motion and neck supple  No rigidity or tenderness     Lymphadenopathy:      " Cervical: No cervical adenopathy  Skin:     General: Skin is warm  Capillary Refill: Capillary refill takes less than 2 seconds  Neurological:      General: No focal deficit present  Mental Status: She is alert and oriented to person, place, and time  Mental status is at baseline     Psychiatric:         Mood and Affect: Mood normal          Behavior: Behavior normal

## 2023-05-05 ENCOUNTER — TELEPHONE (OUTPATIENT)
Dept: URGENT CARE | Facility: CLINIC | Age: 23
End: 2023-05-05

## 2023-05-05 NOTE — TELEPHONE ENCOUNTER
Patient called stating that she is worsening S/S  I spoke to the provider here today at [facility]  The provider stated that if the patient has had worsening s/s she should f/u with the ED or PCP  Or we can gladly see her again

## 2023-05-08 ENCOUNTER — TELEPHONE (OUTPATIENT)
Dept: GASTROENTEROLOGY | Facility: CLINIC | Age: 23
End: 2023-05-08

## 2023-05-08 NOTE — TELEPHONE ENCOUNTER
Patients GI provider:  Dr Brandi Lozano    Number to return call: [phone]    Reason for call: Pt calling because she states there was a lot of blood in the toilet after a bowel movement this morning    Scheduled procedure/appointment date if applicable: Appt 1/1/17

## 2023-05-08 NOTE — TELEPHONE ENCOUNTER
I connected with the patient on the phone  Pt noticed bright red blood in the toilet bowel and toilet paper  Picture sent via [facility] [id number]  Pt taking Amoxicillin for strep throat- treatment will be completed on 5/12  Denies abdominal pain  Multiple loose bowel movements for the past 3 days       Recommended  Wet toilet paper/wet wipes after bowel movements  Preperation H for rectal discomfort-no more that 7-10 days  8 cups water per day  Call our office if another episode of bleeding occurs  Advised urgent care or ED for lightheaded or dizziness  Next OV 6/1

## 2023-05-23 ENCOUNTER — OFFICE VISIT (OUTPATIENT)
Dept: URGENT CARE | Facility: CLINIC | Age: 23
End: 2023-05-23

## 2023-05-23 VITALS
HEART RATE: 108 BPM | SYSTOLIC BLOOD PRESSURE: 112 MMHG | TEMPERATURE: 97.1 F | DIASTOLIC BLOOD PRESSURE: 70 MMHG | RESPIRATION RATE: 18 BRPM | OXYGEN SATURATION: 100 %

## 2023-05-23 DIAGNOSIS — J02.9 PHARYNGITIS, UNSPECIFIED ETIOLOGY: Primary | ICD-10-CM

## 2023-05-23 LAB — S PYO AG THROAT QL: NEGATIVE

## 2023-05-23 NOTE — LETTER
May 23, 2023     Patient: Crystal Thomas   YOB: 2000   Date of Visit: 5/23/2023       To Whom It May Concern:    Patient seen in office today for acute illness  No work until without fever for 24 hours without having to take anti fever medication           Sincerely,        Baldev Damian PA-C    CC: No Recipients

## 2023-05-23 NOTE — PROGRESS NOTES
[facility]    NAME: Ad Carney is a 26 y o  female  : 2000    MRN: [id number]  DATE: May 23, 2023  TIME: 9:53 AM    Assessment and Plan   Pharyngitis, unspecified etiology [J02 9]  1  Pharyngitis, unspecified etiology  POCT rapid strepA    Throat culture          Patient Instructions   Patient Instructions   Rapid strep is negative  No antibiotic indicated at this time  There are a number of viral respiratory illnesses that can present similarly  Most are self-limiting  Strongly encourage getting plenty of rest over the next few days  Increase your hydration  Vaporizer by bedside may also be helpful  Symptom Relief Suggestions: If you are having any sore throat or hoarseness,  you may do warm salt water gargles every 1-2 hours while awake, throat lozenges, Tylenol, voice rest, warm tea with honey  If you are having sinus pressure, nasal congestion, runny nose, and / or post nasal drip you may try the following to help ease your symptoms:            *Clearing your sinuses in a nice steamy shower may be helpful, especially first thing after waking  *Nasal saline rinses every 1-2 hours while awake may also help decrease nasal congestion, drainage  *Afrin nasal spray if significant nasal congestion at bedtime may use   (Do not use for over 3 days however )       *Decongestant / expectorant such as Mucinex D 12 hour 1/2 to 1 tablet as needed with full glass of fluids may help decrease pressure and drainage  If you are having difficulty with ear pressure, discomfort:     Decongestant may be helpful  Flonase nasal spray  Warm compresses against ear(s) for comfort  Although bothersome, mucous is not necessarily a bad thing  Production of mucous is the body's way of trying to capture and flush irritants from mucosal surfaces  Yellow or green mucous does not necessarily mean you have a bacterial infection     Mucous will become more discolored over time, especially first thing in the morning, as your body's immune system  floods the mucosal surfaces with white bloods cells to try and help fight  infection  This white blood cell debride can also cause mucous to be discolored  Again, using nasal saline spray frequently may help soothe and keep mucous flowing out versus getting dried, thickened and / or stuck leading to more sinus pain and pressure  If you have a cough, please realize that a cough is not necessarily a bad thing but a way that your body may be trying to keep your airways clear  Phlegm may be more discolored in the morning  Please note that discolored phlegm does not necessarily mean a bacterial infection  The following things may help with your cough:         *Warm tea with honey or a teaspoon of honey periodically throughout day and / or before bed  *You may also use plain Mucinex (an expectorant to help keep mucus thin so you can clear it easier) or Mucinex DM (expectorant / cough suyppressant) to help decrease cough if it is bothering your sleep  An expectorant works best if you take with full glass of fluids  Other night time cough medication options include Delsym, Robitussin DM, NyQuil  *Propping with an extra pillow or two may be helpful  *Keep water by your bedside to sip on as needed  *Cough drops  Most upper respiratory symptoms start to improve after 7-12 days days but may take a few weeks to completely resolve  Mucus may be more discolored first thing in the morning  Discolored mucous does not necessarily mean bacterial infection but can be dehydrated mucous or mucous filled with white blood cell debride that have been helping you to fight your illness  Follow up with your PCP office if not improving over next 10 days  If significant weakness, chest pain, shortness of breath proceed to ER for immediate further evaluation  Transmission precautions advised           Chief Complaint     Chief Complaint   Patient presents with   • Sore Throat     Pt C/O sore throat and fever (101  Max temp) that started yesterday  History of Present Illness   Nancy Castro presents to the clinic c/o  25-year-old female comes in with fever and sore throat  Started: Yesterday sore throat cough started and then fever  Associated signs and symptoms: Fever Tmax 102  Modifying factors: Tylenol  Known Exposures: No specific known exposures but treated for strep May 2, 2023 with 10-day course of amoxicillin 500 after testing positive for strep ear  Has appointment for new patient visit next week with primary care  Review of Systems   Review of Systems   Constitutional: Positive for activity change, appetite change and fever  HENT: Positive for postnasal drip, sore throat and trouble swallowing  Negative for congestion and rhinorrhea  Eyes: Negative  Respiratory: Positive for cough  Negative for choking, chest tightness, shortness of breath, wheezing and stridor  Gastrointestinal: Negative for abdominal pain and diarrhea  Skin: Negative for rash  Hematological: Positive for adenopathy         Current Medications     Long-Term Medications   Medication Sig Dispense Refill   • ondansetron (ZOFRAN) 4 mg tablet Take 4 mg by mouth every 8 (eight) hours as needed for nausea or vomiting (Patient not taking: Reported on 5/2/2023)         Current Allergies     Allergies as of 05/23/2023 - Reviewed 05/23/2023   Allergen Reaction Noted   • Diphenhydramine Other (See Comments) 09/20/2021          The following portions of the patient's history were reviewed and updated as appropriate: allergies, current medications, past family history, past medical history, past social history, past surgical history and problem list   Past Medical History:   Diagnosis Date   • Anxiety    • Autism    • Concussion 2018    Tripped over recliner     Past Surgical History:   Procedure Laterality Date   • INSERTION OF INTRAUTERINE DEVICE (IUD)  03/2019     Family History   Problem Relation Age of Onset   • Breast cancer Neg Hx    • Uterine cancer Neg Hx    • Ovarian cancer Neg Hx    • Colon cancer Neg Hx        Objective   /70 (BP Location: Left arm, Patient Position: Sitting, Cuff Size: Standard)   Pulse (!) 108   Temp (!) 97 1 °F (36 2 °C) (Tympanic) Comment: took tylenol  Resp 18   SpO2 100%   No LMP recorded  Patient has had an implant  Physical Exam     Physical Exam  Vitals and nursing note reviewed  Constitutional:       General: She is not in acute distress  Appearance: She is well-developed  She is ill-appearing  She is not toxic-appearing or diaphoretic  Comments: Appears mildly ill but in no acute distress  No trismus or conversational dyspnea  HENT:      Head: Normocephalic and atraumatic  Right Ear: Tympanic membrane, ear canal and external ear normal       Left Ear: Tympanic membrane, ear canal and external ear normal       Nose: No congestion or rhinorrhea  Mouth/Throat:      Mouth: Mucous membranes are moist       Pharynx: Uvula midline  Posterior oropharyngeal erythema present  No pharyngeal swelling, oropharyngeal exudate or uvula swelling  Tonsils: No tonsillar exudate or tonsillar abscesses  0 on the right  0 on the left  Comments: Cobblestoning posterior pharynx with patchy redness  Eyes:      General:         Right eye: No discharge  Left eye: No discharge  Conjunctiva/sclera: Conjunctivae normal       Pupils: Pupils are equal, round, and reactive to light  Cardiovascular:      Rate and Rhythm: Regular rhythm  Tachycardia present  Heart sounds: Normal heart sounds  No murmur heard  No friction rub  No gallop  Pulmonary:      Effort: Pulmonary effort is normal  No respiratory distress  Breath sounds: Normal breath sounds  No stridor  No wheezing, rhonchi or rales     Musculoskeletal:      Cervical back: Normal range of motion and neck supple  No rigidity or tenderness  Lymphadenopathy:      Cervical: No cervical adenopathy  Skin:     General: Skin is warm and dry  Coloration: Skin is not jaundiced or pale  Findings: No rash  Neurological:      Mental Status: She is alert and oriented to person, place, and time     Psychiatric:         Mood and Affect: Mood normal          Behavior: Behavior normal

## 2023-05-23 NOTE — PATIENT INSTRUCTIONS
Rapid strep is negative  No antibiotic indicated at this time  There are a number of viral respiratory illnesses that can present similarly  Most are self-limiting  Strongly encourage getting plenty of rest over the next few days  Increase your hydration  Vaporizer by bedside may also be helpful  Symptom Relief Suggestions: If you are having any sore throat or hoarseness,  you may do warm salt water gargles every 1-2 hours while awake, throat lozenges, Tylenol, voice rest, warm tea with honey  If you are having sinus pressure, nasal congestion, runny nose, and / or post nasal drip you may try the following to help ease your symptoms:            *Clearing your sinuses in a nice steamy shower may be helpful, especially first thing after waking  *Nasal saline rinses every 1-2 hours while awake may also help decrease nasal congestion, drainage  *Afrin nasal spray if significant nasal congestion at bedtime may use   (Do not use for over 3 days however )       *Decongestant / expectorant such as Mucinex D 12 hour 1/2 to 1 tablet as needed with full glass of fluids may help decrease pressure and drainage  If you are having difficulty with ear pressure, discomfort:     Decongestant may be helpful  Flonase nasal spray  Warm compresses against ear(s) for comfort  Although bothersome, mucous is not necessarily a bad thing  Production of mucous is the body's way of trying to capture and flush irritants from mucosal surfaces  Yellow or green mucous does not necessarily mean you have a bacterial infection  Mucous will become more discolored over time, especially first thing in the morning, as your body's immune system  floods the mucosal surfaces with white bloods cells to try and help fight  infection  This white blood cell debride can also cause mucous to be discolored    Again, using nasal saline spray frequently may help soothe and keep mucous flowing out versus getting dried, thickened and / or stuck leading to more sinus pain and pressure  If you have a cough, please realize that a cough is not necessarily a bad thing but a way that your body may be trying to keep your airways clear  Phlegm may be more discolored in the morning  Please note that discolored phlegm does not necessarily mean a bacterial infection  The following things may help with your cough:         *Warm tea with honey or a teaspoon of honey periodically throughout day and / or before bed  *You may also use plain Mucinex (an expectorant to help keep mucus thin so you can clear it easier) or Mucinex DM (expectorant / cough suyppressant) to help decrease cough if it is bothering your sleep  An expectorant works best if you take with full glass of fluids  Other night time cough medication options include Delsym, Robitussin DM, NyQuil  *Propping with an extra pillow or two may be helpful  *Keep water by your bedside to sip on as needed  *Cough drops  Most upper respiratory symptoms start to improve after 7-12 days days but may take a few weeks to completely resolve  Mucus may be more discolored first thing in the morning  Discolored mucous does not necessarily mean bacterial infection but can be dehydrated mucous or mucous filled with white blood cell debride that have been helping you to fight your illness  Follow up with your PCP office if not improving over next 10 days  If significant weakness, chest pain, shortness of breath proceed to ER for immediate further evaluation  Transmission precautions advised

## 2023-05-26 LAB — B-HEM STREP SPEC QL CULT: NEGATIVE

## 2023-05-28 ENCOUNTER — HOSPITAL ENCOUNTER (EMERGENCY)
Facility: HOSPITAL | Age: 23
Discharge: HOME | End: 2023-05-28
Attending: EMERGENCY MEDICINE

## 2023-05-28 VITALS
RESPIRATION RATE: 16 BRPM | SYSTOLIC BLOOD PRESSURE: 132 MMHG | TEMPERATURE: 98.4 F | OXYGEN SATURATION: 97 % | HEART RATE: 100 BPM | DIASTOLIC BLOOD PRESSURE: 90 MMHG

## 2023-05-28 DIAGNOSIS — R51.9 HEADACHE: Primary | ICD-10-CM

## 2023-05-28 DIAGNOSIS — J06.9 VIRAL URI: ICD-10-CM

## 2023-05-28 RX ORDER — KETOROLAC TROMETHAMINE 30 MG/ML
15 INJECTION, SOLUTION INTRAMUSCULAR; INTRAVENOUS ONCE
Status: COMPLETED | OUTPATIENT
Start: 2023-05-28 | End: 2023-05-28

## 2023-05-28 RX ORDER — METOCLOPRAMIDE HYDROCHLORIDE 5 MG/ML
10 INJECTION INTRAMUSCULAR; INTRAVENOUS ONCE
Status: COMPLETED | OUTPATIENT
Start: 2023-05-28 | End: 2023-05-28

## 2023-05-28 RX ORDER — OXYMETAZOLINE HYDROCHLORIDE 0.05 G/100ML
2 SPRAY NASAL ONCE
Status: COMPLETED | OUTPATIENT
Start: 2023-05-28 | End: 2023-05-28

## 2023-05-28 RX ADMIN — SODIUM CHLORIDE 1000 ML: 0.9 INJECTION, SOLUTION INTRAVENOUS at 20:10

## 2023-05-28 RX ADMIN — OXYMETAZOLINE HYDROCHLORIDE 2 SPRAY: 0.05 SPRAY NASAL at 20:13

## 2023-05-28 RX ADMIN — KETOROLAC TROMETHAMINE 15 MG: 30 INJECTION, SOLUTION INTRAMUSCULAR; INTRAVENOUS at 20:09

## 2023-05-28 RX ADMIN — METOCLOPRAMIDE 10 MG: 5 INJECTION, SOLUTION INTRAMUSCULAR; INTRAVENOUS at 20:11

## 2023-05-28 NOTE — ED ATTENDING ATTESTATION
5/28/2023  ISocrates DO, saw and evaluated the patient  I have discussed the patient with the resident/non-physician practitioner and agree with the resident's/non-physician practitioner's findings, Plan of Care, and MDM as documented in the resident's/non-physician practitioner's note, except where noted  All available labs and Radiology studies were reviewed  I was present for key portions of any procedure(s) performed by the resident/non-physician practitioner and I was immediately available to provide assistance  At this point I agree with the current assessment done in the Emergency Department  I have conducted an independent evaluation of this patient a history and physical is as follows:    ED Course         Critical Care Time  Procedures    31-year-old female presents with a 2-day history of nasal congestion, sore throat, frontal and parietal headache  No fevers, chills, neck stiffness, nausea or vomiting  She went to an urgent care and was ruled out for strep and told to take Mucinex which she states is not helping  On exam she is alert in no acute distress wearing sunglasses but sitting up talking to her boyfriend  Pupils are equal and reactive to light extraocular muscles are intact  No definite photophobia  Neck is supple without meningeal signs  There is some nasal congestion  Pharynx is clear  Moist mucous membranes  TM normal   Heart regular without murmur  Lungs clear  Skin is warm and dry without rash  Suspect viral illness is the cause of her headache along with a nasal congestion  Will rule out COVID/flu as there is a few flu B cases still in the area    Will treat headache with migraine cocktail and reassess

## 2023-05-28 NOTE — Clinical Note
Moisés Miranda was seen and treated in our emergency department on 5/28/2023  Diagnosis:     Nancy    She may return on this date: 05/31/2023         If you have any questions or concerns, please don't hesitate to call        Karen Newby MD    ______________________________           _______________          _______________  Hospital Representative                              Date                                Time

## 2023-05-29 NOTE — ASSESSMENT & PLAN NOTE
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

## 2023-05-29 NOTE — DISCHARGE INSTRUCTIONS
Follow-up with your scheduled primary care appointment on Wednesday  You can use Tylenol and ibuprofen as needed for headache  Return to the emergency room if symptoms worsen or if you have any other concerns

## 2023-05-29 NOTE — ED PROVIDER NOTES
History  Chief Complaint   Patient presents with   • Headache     Pt reports headache x2 days  Pt reports light sensitivity  Pt reports severe nasal congestion, sinus congestion and feels like her ears are full  Pt reports taking ibuprofen with no relief  HPI  Eduin Dunham is a 26 y o  female who presents to the emergency department with congestion and headache  She reports for the past week she has had congestion, sore throat, and dry cough  She presented to an urgent care and had a negative strep test   Over the past 2 days she has had headache and light sensitivity  Her last dose of medication was Tylenol around noon  Prior to Admission Medications   Prescriptions Last Dose Informant Patient Reported? Taking?   ondansetron (ZOFRAN) 4 mg tablet   Yes No   Sig: Take 4 mg by mouth every 8 (eight) hours as needed for nausea or vomiting   Patient not taking: Reported on 5/2/2023      Facility-Administered Medications: None       Past Medical History:   Diagnosis Date   • Anxiety    • Autism    • Concussion 2018    Tripped over recliner       Past Surgical History:   Procedure Laterality Date   • INSERTION OF INTRAUTERINE DEVICE (IUD)  03/2019   • REMOVAL VAGINAL SEPTUM         Family History   Problem Relation Age of Onset   • Breast cancer Neg Hx    • Uterine cancer Neg Hx    • Ovarian cancer Neg Hx    • Colon cancer Neg Hx      I have reviewed and agree with the history as documented      E-Cigarette/Vaping   • E-Cigarette Use Former User      E-Cigarette/Vaping Substances   • Nicotine No    • THC No    • CBD Yes    • Flavoring No    • Other No    • Unknown No      Social History     Tobacco Use   • Smoking status: Never   • Smokeless tobacco: Never   Vaping Use   • Vaping Use: Former   • Substances: CBD   Substance Use Topics   • Alcohol use: Not Currently     Comment: social   • Drug use: Yes     Types: Marijuana     Comment: medical marijuana       Home medications:  Prior to Admission Medications Prescriptions Last Dose Informant Patient Reported? Taking?   ondansetron (ZOFRAN) 4 mg tablet   Yes No   Sig: Take 4 mg by mouth every 8 (eight) hours as needed for nausea or vomiting   Patient not taking: Reported on 5/2/2023      Facility-Administered Medications: None     Allergies: Allergies   Allergen Reactions   • Diphenhydramine Other (See Comments)        Review of Systems   Constitutional: Negative for fever  HENT: Positive for congestion and sore throat  Respiratory: Positive for cough  Negative for shortness of breath  Cardiovascular: Negative for chest pain  Gastrointestinal: Negative for abdominal pain, diarrhea, nausea and vomiting  Neurological: Positive for headaches  Negative for syncope, weakness and numbness  All other systems reviewed and are negative  Physical Exam  ED Triage Vitals   Temperature Pulse Respirations Blood Pressure SpO2   05/28/23 1943 05/28/23 1909 05/28/23 1909 05/28/23 1909 05/28/23 1909   98 4 °F (36 9 °C) 100 16 132/90 97 %      Temp Source Heart Rate Source Patient Position - Orthostatic VS BP Location FiO2 (%)   05/28/23 1943 05/28/23 1909 05/28/23 1909 05/28/23 1909 --   Oral Monitor Lying Right arm       Pain Score       05/28/23 1909       10 - Worst Possible Pain             Orthostatic Vital Signs  Vitals:    05/28/23 1909   BP: 132/90   Pulse: 100   Patient Position - Orthostatic VS: Lying       Physical Exam  Vitals and nursing note reviewed  Constitutional:       General: She is not in acute distress  Appearance: She is not toxic-appearing  HENT:      Head: Normocephalic  Mouth/Throat:      Mouth: Mucous membranes are moist       Pharynx: Oropharynx is clear  No oropharyngeal exudate or posterior oropharyngeal erythema  Eyes:      Extraocular Movements: Extraocular movements intact  Pupils: Pupils are equal, round, and reactive to light  Cardiovascular:      Rate and Rhythm: Normal rate and regular rhythm        Heart sounds: No murmur heard  Pulmonary:      Effort: Pulmonary effort is normal  No respiratory distress  Breath sounds: Normal breath sounds  No wheezing, rhonchi or rales  Abdominal:      General: Abdomen is flat  There is no distension  Palpations: Abdomen is soft  Tenderness: There is no abdominal tenderness  There is no guarding or rebound  Musculoskeletal:      Cervical back: Normal range of motion  No rigidity  Skin:     General: Skin is warm and dry  Neurological:      Mental Status: She is alert  Cranial Nerves: No cranial nerve deficit  Sensory: No sensory deficit (Light touch sensation intact and symmetric throughout bilateral upper and lower extremities and face)  Motor: No weakness (Motor strength 5/5 intact and symmetric throughout bilateral upper and lower extremities)  ED Medications  Medications   ketorolac (TORADOL) injection 15 mg (15 mg Intravenous Given 5/28/23 2009)   sodium chloride 0 9 % bolus 1,000 mL (0 mL Intravenous Stopped 5/28/23 2115)   metoclopramide (REGLAN) injection 10 mg (10 mg Intravenous Given 5/28/23 2011)   oxymetazoline (AFRIN) 0 05 % nasal spray 2 spray (2 sprays Each Nare Given 5/28/23 2013)       Diagnostic Studies  Results Reviewed     Procedure Component Value Units Date/Time    FLU/RSV/COVID - if FLU/RSV clinically relevant [[id number]]  (Normal) Collected: 05/28/23 1943    Lab Status: Final result Specimen: Nares from Nose Updated: 05/28/23 2029     SARS-CoV-2 Negative     INFLUENZA A PCR Negative     INFLUENZA B PCR Negative     RSV PCR Negative    Narrative:      FOR PEDIATRIC PATIENTS - copy/paste COVID Guidelines URL to browser: [email or web address]  [employer]    SARS-CoV-2 assay is a Nucleic Acid Amplification assay intended for the  qualitative detection of nucleic acid from SARS-CoV-2 in nasopharyngeal  swabs   Results are for the presumptive identification of SARS-CoV-2 RNA     Positive results are indicative of infection with SARS-CoV-2, the virus  causing COVID-19, but do not rule out bacterial infection or co-infection  with other viruses  Laboratories within the [place] and its  territories are required to report all positive results to the appropriate  public health authorities  Negative results do not preclude SARS-CoV-2  infection and should not be used as the sole basis for treatment or other  patient management decisions  Negative results must be combined with  clinical observations, patient history, and epidemiological information  This test has not been FDA cleared or approved  This test has been authorized by FDA under an Emergency Use Authorization  (EUA)  This test is only authorized for the duration of time the  declaration that circumstances exist justifying the authorization of the  emergency use of an in vitro diagnostic tests for detection of SARS-CoV-2  virus and/or diagnosis of COVID-19 infection under section 564(b)(1) of  the Act, 21 U  S C  762GLJ-0(Q)(9), unless the authorization is terminated  or revoked sooner  The test has been validated but independent review by FDA  and CLIA is pending  Test performed using [employer] GeneXpert: This RT-PCR assay targets N2,  a region unique to SARS-CoV-2  A conserved region in the E-gene was chosen  for pan-Sarbecovirus detection which includes SARS-CoV-2  According to CMS-2020-01-R, this platform meets the definition of high-throughput technology  No orders to display         Procedures  Procedures      ED Course                             SBIRT 22yo+    Flowsheet Row Most Recent Value   Initial Alcohol Screen: US AUDIT-C     1  How often do you have a drink containing alcohol? 0 Filed at: 05/28/2023 1940   2  How many drinks containing alcohol do you have on a typical day you are drinking? 0 Filed at: 05/28/2023 1940   3b  FEMALE Any Age, or MALE 65+:  How often do you have 4 or more drinks on one occassion? 0 Filed at: 05/28/2023 1940   Audit-C Score 0 Filed at: 05/28/2023 1940   DAREK: How many times in the past year have you    Used an illegal drug or used a prescription medication for non-medical reasons? Never Filed at: 05/28/2023 Karlos Hernández Michael Loomis is a 26 y o  female who presents to the emergency department with URI symptoms and headache  Workup including vital signs, physical exam   Headache likely secondary to congestion and URI  Treatment including migraine cocktail and Afrin with improvement in symptoms    Stable for discharge home with primary care follow up, discharge instructions and return precautions given  Disposition  Final diagnoses:   Headache   Viral URI     Time reflects when diagnosis was documented in both MDM as applicable and the Disposition within this note     Time User Action Codes Description Comment    5/28/2023  9:13 PM Nubia Bonleroy Add [R51 9] Headache     5/28/2023  9:13 PM Nubia Woo Add [J06 9] Viral URI       ED Disposition     ED Disposition   Discharge    Condition   Stable    Date/Time   Sun May 28, 2023  9:13 PM    Comment   Nancy Castro discharge to home/self care  Follow-up Information     Follow up With Specialties Details Why [address],[address], [place] [facility]   [address] [place] [address] [address]  [place]  [phone]            Discharge Medication List as of 5/28/2023  9:13 PM      CONTINUE these medications which have NOT CHANGED    Details   ondansetron (ZOFRAN) 4 mg tablet Take 4 mg by mouth every 8 (eight) hours as needed for nausea or vomiting, Historical Med             No discharge procedures on file  PDMP Review     None           ED Provider  Attending physically available and evaluated [facility]  I managed the patient along with the ED Attending      Electronically Signed by    Portions of the record may have been created with voice recognition "software  Occasional wrong word or \"sound a like\" substitutions may have occurred due to the inherent limitations of voice recognition software    Read the chart carefully and recognize, using context, where substitutions have occurred     Joe Abarca MD  05/28/23 2203    "

## 2023-05-31 ENCOUNTER — OFFICE VISIT (OUTPATIENT)
Dept: FAMILY MEDICINE CLINIC | Facility: CLINIC | Age: 23
End: 2023-05-31

## 2023-05-31 VITALS
WEIGHT: 168 LBS | TEMPERATURE: 97.6 F | SYSTOLIC BLOOD PRESSURE: 110 MMHG | BODY MASS INDEX: 26.37 KG/M2 | DIASTOLIC BLOOD PRESSURE: 68 MMHG | HEIGHT: 67 IN | OXYGEN SATURATION: 97 % | HEART RATE: 84 BPM

## 2023-05-31 DIAGNOSIS — F90.2 ADHD (ATTENTION DEFICIT HYPERACTIVITY DISORDER), COMBINED TYPE: ICD-10-CM

## 2023-05-31 DIAGNOSIS — K59.00 CONSTIPATION, UNSPECIFIED CONSTIPATION TYPE: ICD-10-CM

## 2023-05-31 DIAGNOSIS — F32.A ANXIETY AND DEPRESSION: ICD-10-CM

## 2023-05-31 DIAGNOSIS — Z00.00 ROUTINE ADULT HEALTH MAINTENANCE: Primary | ICD-10-CM

## 2023-05-31 DIAGNOSIS — Z13.6 SCREENING FOR CARDIOVASCULAR CONDITION: ICD-10-CM

## 2023-05-31 DIAGNOSIS — F84.0 AUTISM SPECTRUM DISORDER: ICD-10-CM

## 2023-05-31 DIAGNOSIS — Z11.4 SCREENING FOR HIV (HUMAN IMMUNODEFICIENCY VIRUS): ICD-10-CM

## 2023-05-31 DIAGNOSIS — F41.9 ANXIETY AND DEPRESSION: ICD-10-CM

## 2023-05-31 DIAGNOSIS — Z23 IMMUNIZATION DUE: ICD-10-CM

## 2023-05-31 PROBLEM — M21.70 LEG LENGTH DISCREPANCY: Status: ACTIVE | Noted: 2018-08-13

## 2023-05-31 PROBLEM — M41.125 ADOLESCENT IDIOPATHIC SCOLIOSIS OF THORACOLUMBAR REGION: Status: ACTIVE | Noted: 2018-08-31

## 2023-05-31 NOTE — PATIENT INSTRUCTIONS
Complete fasting blood work  Follow up with gastroenterology and gynecology as recommended  Follow up with psychiatry

## 2023-05-31 NOTE — PROGRESS NOTES
Virtual Regular Visit    Verification of patient location:    Patient is located at Other in the following state in which I hold an active license [place]      Assessment/Plan:    Brad Gu is a 26 y o  female with family history of celiac disease, previously seen for abnormal stools including occasional constipation as well as elevated LFTs, in addition to right upper quadrant abdominal pain that had been exacerbated by alcohol, who now presents for follow-up  Recently had what sounds a viral sinus infection or URI  Now with constipation  DQ 2 positive and DQ 8 negative  Prior CRP 1 6  Prior abdominal ultrasound with mild hepatic steatosis and gallbladder sludge  Repeat ultrasound from March normal   [place] showed some mild steatosis with borderline or probable Danny Libia but no fibrosis appreciated  Prior blood work including celiac serologies, smooth muscle antibody, AMA, SUMMER, viral hepatitis serologies negative  Recent blood work from March notable for CMP with glucose 102, chloride 109, otherwise relatively normal including liver enzymes  Most recent hCG negative in April  H  pylori stool antigen negative  She did have rapid strep positivity in early May and then negative on May 23  May 28 flu and COVID swabs negative  Constipation has previously been thought to be IBS-C versus chronic idiopathic constipation versus dysmotility versus pelvic dyssynergia/dyssynergic defecation versus a combination of the above          Problem List Items Addressed This Visit        Other    Constipation - Primary    Relevant Medications    linaCLOtide (Linzess) 72 MCG CAPS   Other Visit Diagnoses     Fatty liver        Elevated LFTs        Family history of celiac disease              Recommend drinking at least 8 cups of water per day  Continue MiraLAX  Consider trial of low-dose Linzess  Recommend limiting alcohol and soda         Reason for visit is   Chief Complaint   Patient presents with   • Virtual Regular Visit        Encounter provider Yesenia Doss MD    Provider located at [address] [address] [phone]      Recent Visits  Date Type Provider Dept   05/31/23 Office Visit [place] [address] [address] recent visits within past 7 days and meeting all other requirements  Today's Visits  Date Type Provider Dept   06/01/23 Telemedicine Yesenia Doss MD [facility]   Showing today's visits and meeting all other requirements  Future Appointments  No visits were found meeting these conditions  Showing future appointments within next 150 days and meeting all other requirements       The patient was identified by name and date of birth  Gorden Gottron was informed that this is a telemedicine visit and that the visit is being conducted through Telephone  My office door was closed  No one else was in the room  She acknowledged consent and understanding of privacy and security of the video platform  The patient has agreed to participate and understands they can discontinue the visit at any time  Patient is aware this is a billable service  It was my intent to perform this visit via video technology but the patient was not able to do a video connection so the visit was completed via audio telephone only  Subjective  Gorden Gottron is a [address] [facility] y o  female with constipation  She was recently sick and was out from work  She is not sure what it was  She had a fever, runny nose, cough, headache (for which she was in the ER), sinus infection, possibly viral      She has been constipated  She did not have a BM for a week, but she went yesterday  No blood or black stools but soft  She has some discomfort when she can not go  She tried a few doses of miralax without relief  Every so often she will take a natural laxative  No heartburn, dysphagia, odynophagia  Some nausea and pregnancy test negative  + weight loss of 10 lbs  No alcohol or smoking  She is drinking water  Past Medical History:   Diagnosis Date   • Anxiety    • Autism    • Concussion 2018    Tripped over recliner       Past Surgical History:   Procedure Laterality Date   • INSERTION OF INTRAUTERINE DEVICE (IUD)  03/2019   • REMOVAL VAGINAL SEPTUM         Current Outpatient Medications   Medication Sig Dispense Refill   • linaCLOtide (Linzess) 72 MCG CAPS Take 72 mcg by mouth daily before breakfast 30 capsule 5     No current facility-administered medications for this visit  Allergies   Allergen Reactions   • Diphenhydramine Other (See Comments)     REVIEW OF SYSTEMS:  10 point ROS reviewed and negative, except as above      PHYSICAL EXAMINATION:  Unable to perform physical examination given the unavailability of a video application         Visit Time  Total Visit Duration: 12

## 2023-05-31 NOTE — PROGRESS NOTES
ADULT ANNUAL [address]  GROUP    NAME: Nancy Castro  AGE: 26 y o  SEX: female  : 2000     DATE: 2023     Assessment and Plan:     Problem List Items Addressed This Visit        Other    ADHD (attention deficit hyperactivity disorder), combined type    Relevant Orders    Ambulatory Referral to Psychiatry    Ambulatory Referral to Behavioral Health    Autism spectrum disorder    Relevant Orders    Ambulatory Referral to Psychiatry    [facility]    Routine adult health maintenance - Primary     Advised on healthy diet and regular exercise; updated tdap, will consider HPV; reviewed preventative measures; advised f/u with GYN         Anxiety and depression     Referred to psychiatry and counseling; f/u guidance given         Relevant Orders    CBC and differential    Comprehensive metabolic panel    Ambulatory Referral to Psychiatry    Ambulatory Referral to [address]    TSH, 3rd generation    Constipation     Chronic; has f/u with GI        Other Visit Diagnoses     Immunization due        Relevant Orders    TDAP VACCINE GREATER THAN OR EQUAL TO 6YO IM (Completed)    Screening for HIV (human immunodeficiency virus)        Relevant Orders    HIV 1/2 AG/AB w Reflex [facility] for 2 yr old and above    Screening for cardiovascular condition        Relevant Orders    Comprehensive metabolic panel    Lipid Panel with Direct LDL reflex          Immunizations and preventive care screenings were discussed with patient today  Appropriate education was printed on patient's after visit summary  Counseling:  Dental Health: discussed importance of regular tooth brushing, flossing, and dental visits  · Exercise: the importance of regular exercise/physical activity was discussed  Recommend exercise 3-5 times per week for at least 30 minutes  BMI Counseling: Body mass index is 26 31 kg/m²   The BMI is above normal  Nutrition recommendations include encouraging healthy choices of fruits and vegetables, decreasing fast food intake, limiting drinks that contain sugar and moderation in carbohydrate intake  Exercise recommendations include moderate physical activity 150 minutes/week, exercising 3-5 times per week and strength training exercises  Rationale for BMI follow-up plan is due to patient being overweight or obese  Depression Screening and Follow-up Plan: Patient was screened for depression during today's encounter  They screened negative with a PHQ-2 score of 0  Return in 1 year (on 5/31/2024) for Annual physical      Chief Complaint:     Chief Complaint   Patient presents with   • Establish Care      History of Present Illness:     Adult Annual Physical   Patient here for a comprehensive physical exam and here to establish care  Getting  in September 1st  Works at [employer] as a   Wears gloves and masks  The patient reports problems - frequent illnesses  Feeling better since ER  Concerned as she seems to get sick often  Does get constipated often and has GI appointment tomorrow  Usually goes about 1 week between miralax  Patient states she would like another opinion about her anxiety and depression and ADHD  Diagnosed on autism spectrum  Feels she was misdiagnosed when she was a child due to her mother manipulating input of her treatments  Concerned as she was diagnosed with fatty liver thought to be secondary to her Depakote  Denies any SI/HI  She currently lives with her fiance's grandmother  Having a little bit of a difficult time living there  She would like to try therapy and be reassessed by psychiatry  Diet and Physical Activity  · Diet/Nutrition: eats one meal a day; more snacking  · Exercise: no formal exercise        Depression Screening  PHQ-2/9 Depression Screening    Little interest or pleasure in doing things: 0 - not at all  Feeling down, depressed, or hopeless: 0 - not at all  PHQ-2 Score: 0  PHQ-2 Interpretation: Negative depression screen       General Health  · Sleep: takes melatonin and uses medical marijuana  · Hearing: normal - bilateral   · Vision: no vision problems  · Dental: regular dental visits  /GYN Health  · Patient is: premenopausal  · Last menstrual period: 3/30/23  · Sexually active; follows with GYN  · Contraceptive method: none  Review of Systems:     Review of Systems   Constitutional: Negative for chills and fever  Respiratory: Negative for shortness of breath  Cardiovascular: Negative for chest pain  Gastrointestinal: Positive for constipation  Negative for blood in stool and diarrhea        Past Medical History:     Past Medical History:   Diagnosis Date   • Anxiety    • Autism    • Concussion 2018    Tripped over recliner      Past Surgical History:     Past Surgical History:   Procedure Laterality Date   • INSERTION OF INTRAUTERINE DEVICE (IUD)  03/2019   • REMOVAL VAGINAL SEPTUM        Social History:     Social History     Socioeconomic History   • Marital status: Single     Spouse name: None   • Number of children: None   • Years of education: None   • Highest education level: None   Occupational History   • None   Tobacco Use   • Smoking status: Never   • Smokeless tobacco: Never   Vaping Use   • Vaping Use: Former   • Substances: CBD   Substance and Sexual Activity   • Alcohol use: Not Currently     Comment: social   • Drug use: Yes     Types: Marijuana     Comment: medical marijuana   • Sexual activity: Yes     Partners: Male     Birth control/protection: None   Other Topics Concern   • None   Social History Narrative    Sexual Abuse Have you been sexually abused? no    Exercise: Occassionally    Domestic violence: No    History of drug/alcohol abuse denies    Sexual Activity sexually active >1 partner in last year         Social Determinants of Health     Financial Resource Strain: Not on file   Food Insecurity: Not on file "  Transportation Needs: Not on file   Physical Activity: Not on file   Stress: Not on file   Social Connections: Not on file   Intimate Partner Violence: Not on file   Housing Stability: Not on file      Family History:     Family History   Problem Relation Age of Onset   • Breast cancer Neg Hx    • Uterine cancer Neg Hx    • Ovarian cancer Neg Hx    • Colon cancer Neg Hx       Current Medications:     No current outpatient medications on file  No current facility-administered medications for this visit  Allergies: Allergies   Allergen Reactions   • Diphenhydramine Other (See Comments)      Physical Exam:     /68 (BP Location: Left arm, Patient Position: Sitting, Cuff Size: Standard)   Pulse 84   Temp 97 6 °F (36 4 °C) (Tympanic)   Ht 5' 7\" (1 702 m)   Wt 76 2 kg (168 lb)   LMP 04/01/2023 (Exact Date)   SpO2 97%   BMI 26 31 kg/m²     Physical Exam  Vitals reviewed  Constitutional:       General: She is not in acute distress  Appearance: Normal appearance  She is normal weight  She is not ill-appearing or toxic-appearing  HENT:      Head: Normocephalic and atraumatic  Right Ear: Tympanic membrane, ear canal and external ear normal  There is no impacted cerumen  Left Ear: Tympanic membrane, ear canal and external ear normal  There is no impacted cerumen  Nose: Nose normal       Mouth/Throat:      Mouth: Mucous membranes are moist       Pharynx: No oropharyngeal exudate or posterior oropharyngeal erythema  Eyes:      General: No scleral icterus  Left eye: No discharge  Conjunctiva/sclera: Conjunctivae normal       Pupils: Pupils are equal, round, and reactive to light  Neck:      Thyroid: No thyroid mass, thyromegaly or thyroid tenderness  Cardiovascular:      Rate and Rhythm: Normal rate and regular rhythm  Heart sounds: Normal heart sounds  No murmur heard  Pulmonary:      Effort: Pulmonary effort is normal  No respiratory distress        Breath " sounds: Normal breath sounds  No wheezing, rhonchi or rales  Abdominal:      General: Abdomen is flat  Palpations: There is no mass  Tenderness: There is no abdominal tenderness  There is no guarding  Hernia: No hernia is present  Musculoskeletal:         General: No swelling  Cervical back: Neck supple  No muscular tenderness  Lymphadenopathy:      Cervical: No cervical adenopathy  Skin:     Findings: No rash  Neurological:      Mental Status: She is alert and oriented to person, place, and time  Psychiatric:         Mood and Affect: Mood normal          Behavior: Behavior normal          Thought Content:  Thought content normal          Judgment: Judgment normal           Marlon Matt, DO  [facility]

## 2023-05-31 NOTE — ASSESSMENT & PLAN NOTE
Advised on healthy diet and regular exercise; updated tdap, will consider HPV; reviewed preventative measures; advised f/u with GYN

## 2023-06-01 ENCOUNTER — TELEMEDICINE (OUTPATIENT)
Dept: GASTROENTEROLOGY | Facility: CLINIC | Age: 23
End: 2023-06-01

## 2023-06-01 DIAGNOSIS — R79.89 ELEVATED LFTS: ICD-10-CM

## 2023-06-01 DIAGNOSIS — K59.00 CONSTIPATION, UNSPECIFIED CONSTIPATION TYPE: Primary | ICD-10-CM

## 2023-06-01 DIAGNOSIS — Z83.79 FAMILY HISTORY OF CELIAC DISEASE: ICD-10-CM

## 2023-06-01 DIAGNOSIS — K76.0 FATTY LIVER: ICD-10-CM

## 2023-06-01 RX ORDER — LINACLOTIDE 72 UG/1
72 CAPSULE, GELATIN COATED ORAL
Qty: 30 CAPSULE | Refills: 5 | Status: SHIPPED | OUTPATIENT
Start: 2023-06-01

## 2023-06-02 ENCOUNTER — APPOINTMENT (OUTPATIENT)
Dept: LAB | Facility: CLINIC | Age: 23
End: 2023-06-02
Payer: MEDICARE

## 2023-06-02 DIAGNOSIS — F32.A ANXIETY AND DEPRESSION: Primary | ICD-10-CM

## 2023-06-02 DIAGNOSIS — Z11.4 SCREENING FOR HIV (HUMAN IMMUNODEFICIENCY VIRUS): ICD-10-CM

## 2023-06-02 DIAGNOSIS — F41.9 ANXIETY AND DEPRESSION: Primary | ICD-10-CM

## 2023-06-02 DIAGNOSIS — Z13.6 SCREENING FOR CARDIOVASCULAR CONDITION: ICD-10-CM

## 2023-06-02 LAB
ALBUMIN SERPL BCP-MCNC: 3.6 G/DL (ref 3.5–5)
ALP SERPL-CCNC: 87 U/L (ref 46–116)
ALT SERPL W P-5'-P-CCNC: 46 U/L (ref 12–78)
ANION GAP SERPL CALCULATED.3IONS-SCNC: 3 MMOL/L (ref 4–13)
AST SERPL W P-5'-P-CCNC: 22 U/L (ref 5–45)
BASOPHILS # BLD AUTO: 0.04 THOUSANDS/ÂΜL (ref 0–0.1)
BASOPHILS NFR BLD AUTO: 1 % (ref 0–1)
BILIRUB SERPL-MCNC: 0.25 MG/DL (ref 0.2–1)
BUN SERPL-MCNC: 10 MG/DL (ref 5–25)
CALCIUM SERPL-MCNC: 9.2 MG/DL (ref 8.3–10.1)
CHLORIDE SERPL-SCNC: 108 MMOL/L (ref 96–108)
CHOLEST SERPL-MCNC: 103 MG/DL
CO2 SERPL-SCNC: 27 MMOL/L (ref 21–32)
CREAT SERPL-MCNC: 0.68 MG/DL (ref 0.6–1.3)
EOSINOPHIL # BLD AUTO: 0.23 THOUSAND/ÂΜL (ref 0–0.61)
EOSINOPHIL NFR BLD AUTO: 3 % (ref 0–6)
ERYTHROCYTE [DISTWIDTH] IN BLOOD BY AUTOMATED COUNT: 12.3 % (ref 11.6–15.1)
GFR SERPL CREATININE-BSD FRML MDRD: 124 ML/MIN/1.73SQ M
GLUCOSE P FAST SERPL-MCNC: 89 MG/DL (ref 65–99)
HCT VFR BLD AUTO: 37.9 % (ref 34.8–46.1)
HDLC SERPL-MCNC: 26 MG/DL
HGB BLD-MCNC: 12 G/DL (ref 11.5–15.4)
HIV 1+2 AB+HIV1 P24 AG SERPL QL IA: NORMAL
HIV 2 AB SERPL QL IA: NORMAL
HIV1 AB SERPL QL IA: NORMAL
HIV1 P24 AG SERPL QL IA: NORMAL
IMM GRANULOCYTES # BLD AUTO: 0.02 THOUSAND/UL (ref 0–0.2)
IMM GRANULOCYTES NFR BLD AUTO: 0 % (ref 0–2)
LDLC SERPL CALC-MCNC: 56 MG/DL (ref 0–100)
LYMPHOCYTES # BLD AUTO: 2.23 THOUSANDS/ÂΜL (ref 0.6–4.47)
LYMPHOCYTES NFR BLD AUTO: 27 % (ref 14–44)
MCH RBC QN AUTO: 26.9 PG (ref 26.8–34.3)
MCHC RBC AUTO-ENTMCNC: 31.7 G/DL (ref 31.4–37.4)
MCV RBC AUTO: 85 FL (ref 82–98)
MONOCYTES # BLD AUTO: 0.52 THOUSAND/ÂΜL (ref 0.17–1.22)
MONOCYTES NFR BLD AUTO: 6 % (ref 4–12)
NEUTROPHILS # BLD AUTO: 5.09 THOUSANDS/ÂΜL (ref 1.85–7.62)
NEUTS SEG NFR BLD AUTO: 63 % (ref 43–75)
NRBC BLD AUTO-RTO: 0 /100 WBCS
PLATELET # BLD AUTO: 326 THOUSANDS/UL (ref 149–390)
PMV BLD AUTO: 10 FL (ref 8.9–12.7)
POTASSIUM SERPL-SCNC: 3.8 MMOL/L (ref 3.5–5.3)
PROT SERPL-MCNC: 7.6 G/DL (ref 6.4–8.4)
RBC # BLD AUTO: 4.46 MILLION/UL (ref 3.81–5.12)
SODIUM SERPL-SCNC: 138 MMOL/L (ref 135–147)
TRIGL SERPL-MCNC: 105 MG/DL
TSH SERPL DL<=0.05 MIU/L-ACNC: 1.46 UIU/ML (ref 0.45–4.5)
WBC # BLD AUTO: 8.13 THOUSAND/UL (ref 4.31–10.16)

## 2023-06-02 PROCEDURE — 85025 COMPLETE CBC W/AUTO DIFF WBC: CPT

## 2023-06-02 PROCEDURE — 36415 COLL VENOUS BLD VENIPUNCTURE: CPT

## 2023-06-02 PROCEDURE — 84443 ASSAY THYROID STIM HORMONE: CPT

## 2023-06-02 PROCEDURE — 80053 COMPREHEN METABOLIC PANEL: CPT

## 2023-06-02 PROCEDURE — 80061 LIPID PANEL: CPT

## 2023-06-02 PROCEDURE — 87389 HIV-1 AG W/HIV-1&-2 AB AG IA: CPT

## 2023-06-08 ENCOUNTER — HOSPITAL ENCOUNTER (OUTPATIENT)
Dept: ULTRASOUND IMAGING | Facility: HOSPITAL | Age: 23
End: 2023-06-08
Attending: OBSTETRICS & GYNECOLOGY
Payer: MEDICARE

## 2023-06-08 DIAGNOSIS — Q51.28 UTERINE SEPTUM: ICD-10-CM

## 2023-06-08 PROCEDURE — 58340 CATHETER FOR HYSTEROGRAPHY: CPT

## 2023-06-08 PROCEDURE — 76831 ECHO EXAM UTERUS: CPT

## 2023-06-20 ENCOUNTER — TELEPHONE (OUTPATIENT)
Dept: PSYCHIATRY | Facility: CLINIC | Age: 23
End: 2023-06-20

## 2023-06-20 NOTE — TELEPHONE ENCOUNTER
Contacted patient in regards to Routine Referral in attempts to verify patient's needs of services and add patient to proper wait list  spoke with patient whom stated they are interested in services  Writer verified patient's demographics and unfortunately notified patient [facility] currently do not service patient insurance [facility]  Extra resource options provided       EXTRA RESOURCE PACKET SENT

## 2023-06-26 ENCOUNTER — OFFICE VISIT (OUTPATIENT)
Dept: URGENT CARE | Facility: CLINIC | Age: 23
End: 2023-06-26
Payer: MEDICARE

## 2023-06-26 VITALS
TEMPERATURE: 97.7 F | HEIGHT: 67 IN | BODY MASS INDEX: 26.37 KG/M2 | OXYGEN SATURATION: 98 % | RESPIRATION RATE: 16 BRPM | DIASTOLIC BLOOD PRESSURE: 85 MMHG | HEART RATE: 77 BPM | SYSTOLIC BLOOD PRESSURE: 128 MMHG | WEIGHT: 168 LBS

## 2023-06-26 DIAGNOSIS — L23.7 POISON IVY DERMATITIS: Primary | ICD-10-CM

## 2023-06-26 PROCEDURE — 99213 OFFICE O/P EST LOW 20 MIN: CPT | Performed by: PHYSICIAN ASSISTANT

## 2023-06-26 RX ORDER — PREDNISONE 10 MG/1
TABLET ORAL
Qty: 24 TABLET | Refills: 0 | Status: SHIPPED | OUTPATIENT
Start: 2023-06-26

## 2023-06-26 RX ORDER — TRIAMCINOLONE ACETONIDE 1 MG/G
CREAM TOPICAL 2 TIMES DAILY
Qty: 30 G | Refills: 0 | Status: SHIPPED | OUTPATIENT
Start: 2023-06-26

## 2023-06-26 NOTE — PROGRESS NOTES
"  [facility]        NAME: Amira Jacobs is a 26 y o  female  : 2000    MRN: [id number]  DATE: 2023  TIME: 10:43 AM    Assessment and Plan   Poison ivy dermatitis [L23 7]  1  Poison ivy dermatitis  predniSONE 10 mg tablet    triamcinolone (KENALOG) 0 1 % cream            Patient Instructions     Pt has poison ivy rash which I will treat with an oral Prednisone taper and topical Triamcinolone cream  Rec OTC antihistamines for itching  Should be reevaluated if condition persists/worsens despite treatment  Follow up with PCP in 3-5 days  Proceed to  ER if symptoms worsen  Chief Complaint     Chief Complaint   Patient presents with   • Rash     Patient with poison to bilat arms for 4 days         History of Present Illness       Pt presents with 4 day hx of B/L itchy rash mostly on her UEs  Believes due to poison ivy and is spreading/getting worse  She has some weeping  Denies fever, chills, or other symptoms  Has tried \"everything\" OTC  Review of Systems   Review of Systems   Constitutional: Negative  Respiratory: Negative  Cardiovascular: Negative  Gastrointestinal: Negative  Genitourinary: Negative  Skin: Positive for rash (B/L UEs)  Current Medications       Current Outpatient Medications:   •  predniSONE 10 mg tablet, 3-9-5-4-3-2-1 taper with food  , Disp: 24 tablet, Rfl: 0  •  triamcinolone (KENALOG) 0 1 % cream, Apply topically 2 (two) times a day, Disp: 30 g, Rfl: 0  •  linaCLOtide (Linzess) 72 MCG CAPS, Take 72 mcg by mouth daily before breakfast (Patient not taking: Reported on 2023), Disp: 30 capsule, Rfl: 5    Current Allergies     Allergies as of 2023   • (No Known Allergies)            The following portions of the patient's history were reviewed and updated as appropriate: allergies, current medications, past family history, past medical history, past social history, past surgical history and problem list      Past Medical History: " "  Diagnosis Date   • Anxiety    • Autism    • Concussion 2018    Tripped over recliner       Past Surgical History:   Procedure Laterality Date   • INSERTION OF INTRAUTERINE DEVICE (IUD)  03/2019   • REMOVAL VAGINAL SEPTUM         Family History   Problem Relation Age of Onset   • Breast cancer Neg Hx    • Uterine cancer Neg Hx    • Ovarian cancer Neg Hx    • Colon cancer Neg Hx          Medications have been verified  Objective   /85   Pulse 77   Temp 97 7 °F (36 5 °C) (Tympanic)   Resp 16   Ht 5' 7\" (1 702 m)   Wt 76 2 kg (168 lb)   LMP 06/01/2023 (Exact Date)   SpO2 98%   BMI 26 31 kg/m²   Patient's last menstrual period was 06/01/2023 (exact date)  Physical Exam     Physical Exam  Vitals reviewed  Constitutional:       General: She is not in acute distress  Appearance: She is well-developed  Skin:     Findings: Rash (Erythematous maculopapular rash in linear distribution on B/L UEs resembling poison ivy dermatitis; no current active drainage/weeping) present  Neurological:      Mental Status: She is alert and oriented to person, place, and time                     "

## 2023-06-26 NOTE — PATIENT INSTRUCTIONS
Poison Ivy   WHAT YOU NEED TO KNOW:   Poison ivy is a plant that can cause an itchy, uncomfortable rash on your skin  Poison ivy grows as a shrub or vine in woods, fields, and areas of thick [place]  It has 3 bright green leaves on each stem that turn red in christ[place]  DISCHARGE INSTRUCTIONS:   Medicines:   Antiseptic or drying creams or ointments: These medicines may be used to dry out the rash and decrease the itching  These products may be available without a doctor's order  Steroids: This medicine helps decrease itching and inflammation  It can be given as a cream to apply to your skin or as a pill  Antihistamines: This medicine may help decrease itching and help you sleep  It is available without a doctor's order  Take your medicine as directed  Contact your healthcare provider if you think your medicine is not helping or if you have side effects  Tell your provider if you are allergic to any medicine  Keep a list of the medicines, vitamins, and herbs you take  Include the amounts, and when and why you take them  Bring the list or the pill bottles to follow-up visits  Carry your medicine list with you in case of an emergency  Follow up with your doctor as directed:  Write down your questions so you remember to ask them during your visits  How your poison ivy rash spreads: You cannot spread poison ivy by touching your rash or the liquid from your blisters  Poison ivy is spread only if you scratch your skin while it still has oil on it  You may think your rash is spreading because new rashes appear over a number of days  This happens because areas covered by thin skin break out in a rash first  Your face or forearms may develop a rash before thicker areas, such as the palms of your hands  Self-care:   Keep your rash clean and dry:  Wash it with soap and water  Gently pat it dry with a clean towel  Try not to scratch or rub your rash: This can cause your skin to become infected      Use a compress on your rash:  Dip a clean washcloth in cool water  Wring it out and place it on your rash  Leave the washcloth on your skin for 15 minutes  Do this at least 3 times per day  Take a cornstarch or oatmeal bath: If your rash is too large to cover with wet washcloths, take 3 or 4 cornstarch baths daily  Mix 1 pound of cornstarch with a little water to make a paste  Add the paste to a tub full of water and mix well  You may also use colloidal oatmeal in the bath water  Use lukewarm water  Avoid hot water because it may cause your itching to increase  Prevent a poison ivy rash in the future:   Wear skin protection:  Wear long pants, a long-sleeved shirt, and gloves  Use a skin block lotion to protect your skin from poison ivy oil  You can find this at a drugstore without a prescription  Wash clothing after possible exposure: If you think you have been near a poison ivy plant, wash the clothes you were wearing separately from other clothes  Rinse the washing machine well after you take the clothes out  Scrub boots and shoes with warm, soapy water  Dry clean items and clothing that you cannot wash in water  Poison ivy oil is sticky and can stay on surfaces for a long time  It can cause a new rash even years later  Bathe your pet:  Use warm water and shampoo on your pet's fur  This will prevent the spread of oil to your skin, car, and home  Wear long sleeves, long pants, and gloves while washing pets or any items that may have oil on them  Reduce exposure to poison ivy:  Do not touch plants that look like poison ivy  Keep your yard free of poison ivy  While protecting your skin, remove the plant and the roots  Place them in a plastic bag and seal the bag tightly  Do not burn poison ivy plants: This can spread the oil through the air  If you breathe the oil into your lungs, you could have swelling and serious breathing problems   Oil that clings to the fire brady can land on your skin and cause a rash  Contact your healthcare provider if:   You have pus, soft yellow scabs, or tenderness on the rash  The itching gets worse or keeps you awake at night  The rash covers more than 1/4 of your skin or spreads to your eyes, mouth, or genital area  The rash is not better after 2 to 3 weeks  You have tender, swollen glands on the sides of your neck  You have swelling in your arms and legs  You have questions or concerns about your condition or care  Return to the emergency department if:   You have a fever  You have redness, swelling, and tenderness around the rash  You have trouble breathing  © Copyright Joshua Maldonado 2022 Information is for End User's use only and may not be sold, redistributed or otherwise used for commercial purposes  The above information is an  only  It is not intended as medical advice for individual conditions or treatments  Talk to your doctor, nurse or pharmacist before following any medical regimen to see if it is safe and effective for you

## 2023-06-29 ENCOUNTER — TELEPHONE (OUTPATIENT)
Dept: OBGYN CLINIC | Facility: MEDICAL CENTER | Age: 23
End: 2023-06-29

## 2023-06-29 DIAGNOSIS — Z32.01 POSITIVE PREGNANCY TEST: Primary | ICD-10-CM

## 2023-06-29 NOTE — TELEPHONE ENCOUNTER
Patient called about a positive pregnancy test  LMP was on 06/01/23  Patient aware of getting labs done in two weeks  Please place labs into chart  Please review when you get a chance   Thank you

## 2023-06-30 ENCOUNTER — APPOINTMENT (OUTPATIENT)
Dept: LAB | Facility: CLINIC | Age: 23
End: 2023-06-30
Payer: MEDICARE

## 2023-06-30 DIAGNOSIS — Z32.01 POSITIVE PREGNANCY TEST: Primary | ICD-10-CM

## 2023-06-30 LAB
B-HCG SERPL-ACNC: 175 MIU/ML (ref 0–5)
PROGEST SERPL-MCNC: 11.33 NG/ML

## 2023-06-30 PROCEDURE — 84144 ASSAY OF PROGESTERONE: CPT

## 2023-06-30 PROCEDURE — 36415 COLL VENOUS BLD VENIPUNCTURE: CPT

## 2023-06-30 PROCEDURE — 84702 CHORIONIC GONADOTROPIN TEST: CPT

## 2023-07-03 ENCOUNTER — TELEPHONE (OUTPATIENT)
Dept: OBGYN CLINIC | Facility: MEDICAL CENTER | Age: 23
End: 2023-07-03

## 2023-07-03 DIAGNOSIS — Z32.01 POSITIVE PREGNANCY TEST: Primary | ICD-10-CM

## 2023-07-03 NOTE — TELEPHONE ENCOUNTER
Pt calling to go over test results. She was called earlier but missed the call. Aware she will be called back to review them. Thank you.

## 2023-07-03 NOTE — TELEPHONE ENCOUNTER
Reviewed results and recommendations with patient. Pt aware to have repeat labs completed this Thursday.  No questions or concerns at this time

## 2023-07-05 ENCOUNTER — LAB (OUTPATIENT)
Dept: LAB | Facility: CLINIC | Age: 23
End: 2023-07-05
Payer: MEDICARE

## 2023-07-05 DIAGNOSIS — Z32.01 POSITIVE PREGNANCY TEST: ICD-10-CM

## 2023-07-05 LAB — B-HCG SERPL-ACNC: 1521 MIU/ML (ref 0–5)

## 2023-07-05 PROCEDURE — 36415 COLL VENOUS BLD VENIPUNCTURE: CPT

## 2023-07-05 PROCEDURE — 84702 CHORIONIC GONADOTROPIN TEST: CPT

## 2023-07-07 ENCOUNTER — TELEPHONE (OUTPATIENT)
Dept: OBGYN CLINIC | Facility: MEDICAL CENTER | Age: 23
End: 2023-07-07

## 2023-07-07 NOTE — RESULT ENCOUNTER NOTE
Please inform patient of normal results / recommend scheduling for dating US when 7-8 weeks of pregnancy

## 2023-07-07 NOTE — TELEPHONE ENCOUNTER
Pt wants to be seen asap for her dating US and would prefer not to wait until end of july.  Can you send a script for radiology so she can set up an appt

## 2023-07-12 ENCOUNTER — TELEPHONE (OUTPATIENT)
Dept: FAMILY MEDICINE CLINIC | Facility: CLINIC | Age: 23
End: 2023-07-12

## 2023-07-12 NOTE — TELEPHONE ENCOUNTER
Reviewed and appreciate her making me aware and congratulations to her. She should be taking a prenatal vitamin if she has not started already. Patient has f/u scheduled with Ob/GYN. She should not be taking anything prescription or OTC medication wise now that she is pregnant without first consulting with GYN or myself.

## 2023-07-15 ENCOUNTER — NURSE TRIAGE (OUTPATIENT)
Dept: OTHER | Facility: OTHER | Age: 23
End: 2023-07-15

## 2023-07-15 NOTE — TELEPHONE ENCOUNTER
Regarding: possibly 6 weeks pregnant/having sharp cramps  ----- Message from Anupam Petty sent at 7/15/2023  6:56 PM EDT -----  Pt called "I am supposedly 6 weeks pregnant and having some sharp cramping."

## 2023-07-16 ENCOUNTER — APPOINTMENT (EMERGENCY)
Dept: ULTRASOUND IMAGING | Facility: HOSPITAL | Age: 23
End: 2023-07-16
Payer: MEDICARE

## 2023-07-16 ENCOUNTER — HOSPITAL ENCOUNTER (EMERGENCY)
Facility: HOSPITAL | Age: 23
Discharge: HOME | End: 2023-07-17
Attending: EMERGENCY MEDICINE
Payer: MEDICARE

## 2023-07-16 VITALS
BODY MASS INDEX: 26.03 KG/M2 | RESPIRATION RATE: 18 BRPM | TEMPERATURE: 98.3 F | WEIGHT: 166.23 LBS | DIASTOLIC BLOOD PRESSURE: 63 MMHG | SYSTOLIC BLOOD PRESSURE: 102 MMHG | HEART RATE: 85 BPM | OXYGEN SATURATION: 98 %

## 2023-07-16 DIAGNOSIS — O46.90 VAGINAL BLEEDING DURING PREGNANCY: Primary | ICD-10-CM

## 2023-07-16 LAB
ABO GROUP BLD: NORMAL
ANION GAP SERPL CALCULATED.3IONS-SCNC: 5 MMOL/L
B-HCG SERPL-ACNC: ABNORMAL MIU/ML (ref 0–5)
BASOPHILS # BLD AUTO: 0.03 THOUSANDS/ÂΜL (ref 0–0.1)
BASOPHILS NFR BLD AUTO: 0 % (ref 0–1)
BLD GP AB SCN SERPL QL: NEGATIVE
BUN SERPL-MCNC: 11 MG/DL (ref 5–25)
CALCIUM SERPL-MCNC: 9.4 MG/DL (ref 8.4–10.2)
CHLORIDE SERPL-SCNC: 104 MMOL/L (ref 96–108)
CO2 SERPL-SCNC: 26 MMOL/L (ref 21–32)
CREAT SERPL-MCNC: 0.51 MG/DL (ref 0.6–1.3)
EOSINOPHIL # BLD AUTO: 0.17 THOUSAND/ÂΜL (ref 0–0.61)
EOSINOPHIL NFR BLD AUTO: 2 % (ref 0–6)
ERYTHROCYTE [DISTWIDTH] IN BLOOD BY AUTOMATED COUNT: 12.6 % (ref 11.6–15.1)
GFR SERPL CREATININE-BSD FRML MDRD: 136 ML/MIN/1.73SQ M
GLUCOSE SERPL-MCNC: 96 MG/DL (ref 65–140)
HCT VFR BLD AUTO: 34.9 % (ref 34.8–46.1)
HGB BLD-MCNC: 11.4 G/DL (ref 11.5–15.4)
IMM GRANULOCYTES # BLD AUTO: 0.02 THOUSAND/UL (ref 0–0.2)
IMM GRANULOCYTES NFR BLD AUTO: 0 % (ref 0–2)
LYMPHOCYTES # BLD AUTO: 2.72 THOUSANDS/ÂΜL (ref 0.6–4.47)
LYMPHOCYTES NFR BLD AUTO: 32 % (ref 14–44)
MCH RBC QN AUTO: 27.5 PG (ref 26.8–34.3)
MCHC RBC AUTO-ENTMCNC: 32.7 G/DL (ref 31.4–37.4)
MCV RBC AUTO: 84 FL (ref 82–98)
MONOCYTES # BLD AUTO: 0.67 THOUSAND/ÂΜL (ref 0.17–1.22)
MONOCYTES NFR BLD AUTO: 8 % (ref 4–12)
NEUTROPHILS # BLD AUTO: 4.8 THOUSANDS/ÂΜL (ref 1.85–7.62)
NEUTS SEG NFR BLD AUTO: 58 % (ref 43–75)
NRBC BLD AUTO-RTO: 0 /100 WBCS
PLATELET # BLD AUTO: 278 THOUSANDS/UL (ref 149–390)
PMV BLD AUTO: 9.6 FL (ref 8.9–12.7)
POTASSIUM SERPL-SCNC: 3.5 MMOL/L (ref 3.5–5.3)
RBC # BLD AUTO: 4.14 MILLION/UL (ref 3.81–5.12)
RH BLD: POSITIVE
SODIUM SERPL-SCNC: 135 MMOL/L (ref 135–147)
SPECIMEN EXPIRATION DATE: NORMAL
WBC # BLD AUTO: 8.41 THOUSAND/UL (ref 4.31–10.16)

## 2023-07-16 PROCEDURE — 85025 COMPLETE CBC W/AUTO DIFF WBC: CPT

## 2023-07-16 PROCEDURE — 86901 BLOOD TYPING SEROLOGIC RH(D): CPT

## 2023-07-16 PROCEDURE — 76801 OB US < 14 WKS SINGLE FETUS: CPT

## 2023-07-16 PROCEDURE — 86900 BLOOD TYPING SEROLOGIC ABO: CPT

## 2023-07-16 PROCEDURE — 36415 COLL VENOUS BLD VENIPUNCTURE: CPT

## 2023-07-16 PROCEDURE — 81001 URINALYSIS AUTO W/SCOPE: CPT

## 2023-07-16 PROCEDURE — 87086 URINE CULTURE/COLONY COUNT: CPT

## 2023-07-16 PROCEDURE — 99284 EMERGENCY DEPT VISIT MOD MDM: CPT

## 2023-07-16 PROCEDURE — 84702 CHORIONIC GONADOTROPIN TEST: CPT

## 2023-07-16 PROCEDURE — 86850 RBC ANTIBODY SCREEN: CPT

## 2023-07-16 PROCEDURE — 80048 BASIC METABOLIC PNL TOTAL CA: CPT

## 2023-07-16 NOTE — Clinical Note
Alicia Whitten accompanied Nancy Castro to the emergency department on 7/16/2023. Return date if applicable: 43/40/7725        If you have any questions or concerns, please don't hesitate to call.       Lucas Post, DO

## 2023-07-16 NOTE — TELEPHONE ENCOUNTER
Reason for Disposition  • MODERATE-SEVERE abdominal pain (e.g., interferes with normal activities, awakens from sleep)    Answer Assessment - Initial Assessment Questions  1. LOCATION: "Where does it hurt?"       Lower abdominal     2. RADIATION: "Does the pain shoot anywhere else?" (e.g., chest, back, shoulder)      Denies    3. ONSET: "When did the pain begin?" (e.g., minutes, hours or days ago)       This morning     4. ONSET: "Gradual or sudden onset?"      Gradual    5. PATTERN "Does the pain come and go, or has it been constant since it started?"      Intermittent     6. SEVERITY: "How bad is the pain?" "What does it keep you from doing?"  (e.g., Scale 1-10; mild, moderate, or severe)    - MILD (1-3): doesn't interfere with normal activities, abdomen soft and not tender to touch     - MODERATE (4-7): interferes with normal activities or awakens from sleep, tender to touch     - SEVERE (8-10): excruciating pain, doubled over, unable to do any normal activities     0/10 now but comes and goes 8/10    7. RECURRENT SYMPTOM: "Have you ever had this type of stomach pain before?" If Yes, ask: "When was the last time?" and "What happened that time?"       Yes, off and on     8. CAUSE: "What do you think is causing the stomach pain?"      Unknown     9. RELIEVING/AGGRAVATING FACTORS: "What makes it better or worse?" (e.g., antacids, bowel movement, movement)     Stressed out makes the pain worse    10. OTHER SYMPTOMS: "Has there been any vaginal bleeding, fever, vomiting, diarrhea, or urine problems?"    The pain makes me want to vomit when it gets severe     11.  IRENE: "What date are you expecting to deliver?"   3/7/24        Tylenol 500 mg at 1400    Protocols used: PREGNANCY - ABDOMINAL PAIN LESS THAN 20 WEEKS EGA-ADULT-

## 2023-07-17 LAB
BACTERIA UR QL AUTO: ABNORMAL /HPF
BILIRUB UR QL STRIP: NEGATIVE
BUDDING YEAST: PRESENT
CLARITY UR: ABNORMAL
CLARITY, POC: ABNORMAL
COLOR UR: YELLOW
COLOR, POC: YELLOW
EXT BILIRUBIN, UA: NEGATIVE
EXT BLOOD URINE: NEGATIVE
EXT GLUCOSE, UA: NEGATIVE
EXT KETONES: NEGATIVE MG/DL
EXT LEUKOCYTE EST: NEGATIVE
EXT NITRITE, UA: NEGATIVE
EXT PH, UA: 8.5 (ref 4.5–8)
EXT PROTEIN, UA: ABNORMAL MG/DL
EXT SPECIFIC GRAVITY, UA: 1.02 (ref 1–1.03)
EXT UROBILINOGEN: 0.2
GLUCOSE UR STRIP-MCNC: NEGATIVE MG/DL
HGB UR QL STRIP.AUTO: NEGATIVE
KETONES UR STRIP-MCNC: NEGATIVE MG/DL
LEUKOCYTE ESTERASE UR QL STRIP: NEGATIVE
NITRITE UR QL STRIP: NEGATIVE
NON-SQ EPI CELLS URNS QL MICRO: ABNORMAL /HPF
PH UR STRIP.AUTO: 8.5 [PH] (ref 4.5–8)
PROT UR STRIP-MCNC: ABNORMAL MG/DL
RBC #/AREA URNS AUTO: ABNORMAL /HPF
SP GR UR STRIP.AUTO: 1.02 (ref 1–1.03)
UROBILINOGEN UR QL STRIP.AUTO: 0.2 E.U./DL
WBC #/AREA URNS AUTO: ABNORMAL /HPF

## 2023-07-17 NOTE — ED PROVIDER NOTES
History  Chief Complaint   Patient presents with   • Vaginal Bleeding - Pregnant     States lower abd cramping since last night, states light vaginal bleeding this afternoon. Called OB and told to come to ER. Currently 6 wks preg. Patient is a 45-year-old G1, P0 female at approximately 6 weeks gestation based on last menstrual period, presenting for evaluation of vaginal bleeding and lower abdominal cramping. She states that yesterday she had some uterine cramping, and that today she had some vaginal spotting. She describes this as small, dark red blood. She has not been soaking through pads. She has not had any chest pain, difficulty breathing, or syncopal episodes. She has not yet seen an OB/GYN, but does have an appointment scheduled in several weeks. Her pregnancy was conceived naturally. She has otherwise been in her normal state of health and denies acute complaints. Prior to Admission Medications   Prescriptions Last Dose Informant Patient Reported? Taking?   linaCLOtide (Linzess) 72 MCG CAPS   No No   Sig: Take 72 mcg by mouth daily before breakfast   Patient not taking: Reported on 2023   predniSONE 10 mg tablet   No No   Si7-9-8-4-3-2-1 taper with food. triamcinolone (KENALOG) 0.1 % cream   No No   Sig: Apply topically 2 (two) times a day      Facility-Administered Medications: None       Past Medical History:   Diagnosis Date   • Anxiety    • Autism    • Concussion 2018    Tripped over recliner       Past Surgical History:   Procedure Laterality Date   • INSERTION OF INTRAUTERINE DEVICE (IUD)  2019   • REMOVAL VAGINAL SEPTUM         Family History   Problem Relation Age of Onset   • Breast cancer Neg Hx    • Uterine cancer Neg Hx    • Ovarian cancer Neg Hx    • Colon cancer Neg Hx      I have reviewed and agree with the history as documented.     E-Cigarette/Vaping   • E-Cigarette Use Former User      E-Cigarette/Vaping Substances   • Nicotine No    • THC No    • CBD Yes • Flavoring No    • Other No    • Unknown No      Social History     Tobacco Use   • Smoking status: Never   • Smokeless tobacco: Never   Vaping Use   • Vaping Use: Former   • Substances: CBD   Substance Use Topics   • Alcohol use: Not Currently     Comment: social   • Drug use: Yes     Types: Marijuana     Comment: medical marijuana        Review of Systems   Constitutional: Negative for fever. Respiratory: Negative for shortness of breath. Cardiovascular: Negative for chest pain. Genitourinary: Positive for pelvic pain and vaginal bleeding. Negative for dysuria, vaginal discharge and vaginal pain. All other systems reviewed and are negative. Physical Exam  ED Triage Vitals [07/16/23 2149]   Temperature Pulse Respirations Blood Pressure SpO2   98.3 °F (36.8 °C) 93 18 119/73 99 %      Temp Source Heart Rate Source Patient Position - Orthostatic VS BP Location FiO2 (%)   Oral Monitor Sitting Right arm --      Pain Score       5             Orthostatic Vital Signs  Vitals:    07/16/23 2149 07/16/23 2357   BP: 119/73 102/63   Pulse: 93 85   Patient Position - Orthostatic VS: Sitting Sitting       Physical Exam  Vitals and nursing note reviewed. Constitutional:       General: She is not in acute distress. Appearance: Normal appearance. She is not ill-appearing or toxic-appearing. HENT:      Head: Normocephalic and atraumatic. Right Ear: External ear normal.      Left Ear: External ear normal.      Nose: Nose normal.   Eyes:      General: No scleral icterus. Right eye: No discharge. Left eye: No discharge. Extraocular Movements: Extraocular movements intact. Conjunctiva/sclera: Conjunctivae normal.   Cardiovascular:      Rate and Rhythm: Normal rate. Heart sounds: Normal heart sounds. No murmur heard. No friction rub. No gallop. Pulmonary:      Effort: Pulmonary effort is normal. No respiratory distress. Breath sounds: Normal breath sounds. Abdominal:      General: Abdomen is flat. There is no distension. Palpations: Abdomen is soft. There is no mass. Tenderness: There is no abdominal tenderness. Genitourinary:     Comments: Deferred  Skin:     General: Skin is warm and dry. Neurological:      General: No focal deficit present. Mental Status: She is alert. ED Medications  Medications - No data to display    Diagnostic Studies  Results Reviewed     Procedure Component Value Units Date/Time    Urine Microscopic [[id number]]  (Abnormal) Collected: 07/16/23 2359    Lab Status: Final result Specimen: Urine, Clean Catch Updated: 07/17/23 0016     RBC, UA 1-2 /hpf      WBC, UA 2-4 /hpf      Epithelial Cells Occasional /hpf      Bacteria, UA None Seen /hpf      Budding Yeast Present    Urine culture [[id number]] Collected: 07/16/23 2359    Lab Status:  In process Specimen: Urine, Clean Catch Updated: 07/17/23 0007    POCT urinalysis dipstick [[id number]]  (Abnormal) Resulted: 07/17/23 0003    Lab Status: Final result Specimen: Urine Updated: 07/17/23 0005     Color, UA Yellow     Clarity, UA Slightly Cloudy     Spec Grav, UA 1.020     pH, UA 8.5     EXT Leukocytes, UA Negative     Nitrite, UA Negative     Protein, UA 30 (1+) mg/dl      Glucose, UA Negative     Ketones, UA Negative mg/dl      EXT Urobilinogen, UA 0.2      Bilirubin, UA Negative     Blood, UA Negative    Urine Macroscopic, POC [[id number]]  (Abnormal) Collected: 07/16/23 2359    Lab Status: Final result Specimen: Urine Updated: 07/17/23 0000     Color, UA Yellow     Clarity, UA Slightly Cloudy     pH, UA 8.5     Leukocytes, UA Negative     Nitrite, UA Negative     Protein, UA 30 (1+) mg/dl      Glucose, UA Negative mg/dl      Ketones, UA Negative mg/dl      Urobilinogen, UA 0.2 E.U./dl      Bilirubin, UA Negative     Occult Blood, UA Negative     Specific Gravity, UA 1.020    Narrative:      CLINITEK RESULT    hCG, quantitative [[id number]]  (Abnormal) Collected: 07/16/23 2225    Lab Status: Final result Specimen: Blood from Arm, Right Updated: 07/16/23 2325     HCG, Quant 34,165 mIU/mL     Narrative:       Expected Ranges:    HCG results between 5 and 25 mIU/mL may be indicative of early pregnancy but should be interpreted in light of the total clinical presentation. HCG can rise to detectable levels in gerardo and post menopausal women (0-11.6 mIU/mL).      Approximate               Approximate HCG  Gestation age          Concentration ( mIU/mL)  _____________          ______________________   Clark Potash                      HCG values  0.2-1                       5-50  1-2                           2-3                         100-5000  3-4                         500-14110  4-5                         1000-33308  5-6                         [id number]  6-8                         05492-279653  8-12                        15247-296930      Basic metabolic panel [[id number]]  (Abnormal) Collected: 07/16/23 2225    Lab Status: Final result Specimen: Blood from Arm, Right Updated: 07/16/23 2258     Sodium 135 mmol/L      Potassium 3.5 mmol/L      Chloride 104 mmol/L      CO2 26 mmol/L      ANION GAP 5 mmol/L      BUN 11 mg/dL      Creatinine 0.51 mg/dL      Glucose 96 mg/dL      Calcium 9.4 mg/dL      eGFR 136 ml/min/1.73sq m     Narrative:      [place] guidelines for Chronic Kidney Disease (CKD):   •  Stage 1 with normal or high GFR (GFR > 90 mL/min/1.73 square meters)  •  Stage 2 Mild CKD (GFR = 60-89 mL/min/1.73 square meters)  •  Stage 3A Moderate CKD (GFR = 45-59 mL/min/1.73 square meters)  •  Stage 3B Moderate CKD (GFR = 30-44 mL/min/1.73 square meters)  •  Stage 4 Severe CKD (GFR = 15-29 mL/min/1.73 square meters)  •  Stage 5 End Stage CKD (GFR <15 mL/min/1.73 square meters)  Note: GFR calculation is accurate only with a steady state creatinine    CBC and differential [[id number]]  (Abnormal) Collected: 07/16/23 2225    Lab Status: Final result Specimen: Blood from Arm, Right Updated: 07/16/23 2236     WBC 8.41 Thousand/uL      RBC 4.14 Million/uL      Hemoglobin 11.4 g/dL      Hematocrit 34.9 %      MCV 84 fL      MCH 27.5 pg      MCHC 32.7 g/dL      RDW 12.6 %      MPV 9.6 fL      Platelets 165 Thousands/uL      nRBC 0 /100 WBCs      Neutrophils Relative 58 %      Immat GRANS % 0 %      Lymphocytes Relative 32 %      Monocytes Relative 8 %      Eosinophils Relative 2 %      Basophils Relative 0 %      Neutrophils Absolute 4.80 Thousands/µL      Immature Grans Absolute 0.02 Thousand/uL      Lymphocytes Absolute 2.72 Thousands/µL      Monocytes Absolute 0.67 Thousand/µL      Eosinophils Absolute 0.17 Thousand/µL      Basophils Absolute 0.03 Thousands/µL                  US OB < 14 weeks with transvaginal   Final Result by Anabelle Orr MD (07/17 0130)      Single live intrauterine gestation at 5 weeks 6 days +/-4 days. IRENE of 03/11/2024. Workstation performed: [id number]               Procedures  Procedures      ED Course  ED Course as of 07/17/23 1842   Ludmila Diana Jul 16, 2023   2334 HCG QUANTITATIVE(!): 34,165   2334 Rh Factor: Positive   2334 Hemoglobin(!): 11.4  From 12.0                                       Medical Decision Making  Patient is a 27-year-old female presenting for evaluation of vaginal bleeding during pregnancy. Based on history and evaluation, differential diagnoses include but is not limited to: Post implantation bleeding, threatened miscarriage, ectopic pregnancy, acute blood loss anemia, electrolyte disturbance, UTI. Plan: CBC, BMP, quantitative-hCG, type and screen, pelvic ultrasound, reassessment    Labs unremarkable including a stable hemoglobin. Urinalysis without signs of infection. Pelvic ultrasound with intrauterine pregnancy. On reassessment, patient continues to be well-appearing. Presentation most consistent with vaginal bleeding during pregnancy. No evidence of ectopic pregnancy.   Instructed patient that she should follow-up with her OB/GYN within the next 2 days. Patient understands this plan and is agreeable. All questions answered. Patient discharged home with return precautions. I independently reviewed this patient's chart. I considered her chronic medical conditions in my medical decision making. Amount and/or Complexity of Data Reviewed  Labs: ordered. Decision-making details documented in ED Course. Radiology: ordered. Disposition  Final diagnoses:   Vaginal bleeding during pregnancy     Time reflects when diagnosis was documented in both MDM as applicable and the Disposition within this note     Time User Action Codes Description Comment    7/17/2023  1:12 AM Miguel Arvizu Add [O46.90] Vaginal bleeding during pregnancy       ED Disposition     ED Disposition   Discharge    Condition   Stable    Date/Time   Mon Jul 17, 2023  1:39 AM    Comment   Nancy Castro discharge to home/self care. Follow-up Information     Follow up With Specialties Details Why Contact Info Additional [facility]necology Call in 1 day  [address] [id number]  [address] [place] [address], [address] [place] [address], [id number] [phone]          Discharge Medication List as of 7/17/2023  1:41 AM      CONTINUE these medications which have NOT CHANGED    Details   linaCLOtide (Linzess) 72 MCG CAPS Take 72 mcg by mouth daily before breakfast, Starting u 6/1/2023, Normal      predniSONE 10 mg tablet 6-2-6-4-3-2-1 taper with food., Normal      triamcinolone (KENALOG) 0.1 % cream Apply topically 2 (two) times a day, Starting Mon 6/26/2023, Normal               PDMP Review     None           ED Provider  Attending physically available and evaluated [facility]. I managed the patient along with the ED Attending.     Electronically Signed by         Alaina Olmos,   07/17/23 1846

## 2023-07-17 NOTE — TELEPHONE ENCOUNTER
Called patient to follow-up on ED visit from last night. Patient denies any further bleeding but she is having some mild cramping. Bleeding precautions reviewed with patient. Advised to call back with any concerns or if symptoms worsen. US appointment scheduled with us on 7/31.

## 2023-07-17 NOTE — ED ATTENDING ATTESTATION
2023  ICelena MD, saw and evaluated the patient. I have discussed the patient with the resident/non-physician practitioner and agree with the resident's/non-physician practitioner's findings, Plan of Care, and MDM as documented in the resident's/non-physician practitioner's note, except where noted. All available labs and Radiology studies were reviewed. I was present for key portions of any procedure(s) performed by the resident/non-physician practitioner and I was immediately available to provide assistance. At this point I agree with the current assessment done in the Emergency Department. I have conducted an independent evaluation of this patient a history and physical is as follows:  27 yo F  at about 6 weeks gestation by LMP 23, c/o onset of pelvic pain, and vaginal spotting. No fever, no chills. US c/w IUP, , 5w. She can followup with her OB 2-3 days.         ED Course         Critical Care Time  Procedures

## 2023-07-17 NOTE — DISCHARGE INSTRUCTIONS
Your evaluation suggests that your symptoms are due to a non emergent cause. Please follow up with your OB/GYN within two days. Return to the Emergency Department if you experience worsening or concerning symptoms. Thank you for choosing us for your care.

## 2023-07-18 ENCOUNTER — VBI (OUTPATIENT)
Dept: ADMINISTRATIVE | Facility: OTHER | Age: 23
End: 2023-07-18

## 2023-07-18 LAB — BACTERIA UR CULT: NORMAL

## 2023-07-18 NOTE — TELEPHONE ENCOUNTER
Dustin Hartman    ED Visit Information     Ed visit date: 7/16/2023  Diagnosis Description: Vaginal bleeding during pregnancy  In Network? Yes [facility]  Discharge status: Home  Discharged with meds ? No  Number of ED visits to date: 1  ED Severity:4     Outreach Information    Outreach successful: No 2  Date letter mailed:N/A  Date Finalized:7/19/2023  Care Coordination    Follow up appointment with specialilty: yes YES   Transportation issues ?  No    [employer] type: 3 Day Outreach  Called PCP first?: No  Feels able to call PCP for urgent problems ?: Yes  Understands what emergencies can be handled by PCP ?: Yes  Ever any problems getting appointment with PCP for minor emergency/urgency problems?: Yes  Practice Contacted Patient ?: No  Pt had ED follow up with pcp/staff ?: No    Seen for follow-up out of network ?: No  Reason Patient went to ED instead of Urgent Care or PCP?: Perceived Severity of Illness  Urgent care Education?: No  07/19/2023 04:32 PM EDT by Rula Sprague 07/19/2023 04:32 PM EDT by Nancy Rashid (Self) [phone] (Mobile)  [phone] (Mobile) Remove  - Call Complete

## 2023-07-30 PROBLEM — Z00.00 ROUTINE ADULT HEALTH MAINTENANCE: Status: RESOLVED | Noted: 2023-05-31 | Resolved: 2023-07-30

## 2023-07-31 ENCOUNTER — TELEPHONE (OUTPATIENT)
Facility: HOSPITAL | Age: 23
End: 2023-07-31

## 2023-07-31 ENCOUNTER — ULTRASOUND (OUTPATIENT)
Dept: OBGYN CLINIC | Facility: MEDICAL CENTER | Age: 23
End: 2023-07-31
Payer: MEDICARE

## 2023-07-31 VITALS
WEIGHT: 164 LBS | BODY MASS INDEX: 25.74 KG/M2 | SYSTOLIC BLOOD PRESSURE: 110 MMHG | HEIGHT: 67 IN | DIASTOLIC BLOOD PRESSURE: 78 MMHG

## 2023-07-31 DIAGNOSIS — Z34.91 FIRST TRIMESTER PREGNANCY: Primary | ICD-10-CM

## 2023-07-31 DIAGNOSIS — O21.9 NAUSEA/VOMITING IN PREGNANCY: ICD-10-CM

## 2023-07-31 DIAGNOSIS — B37.31 VULVOVAGINAL CANDIDIASIS: ICD-10-CM

## 2023-07-31 DIAGNOSIS — O46.90 VAGINAL BLEEDING DURING PREGNANCY: ICD-10-CM

## 2023-07-31 PROCEDURE — 76817 TRANSVAGINAL US OBSTETRIC: CPT | Performed by: OBSTETRICS & GYNECOLOGY

## 2023-07-31 PROCEDURE — 99214 OFFICE O/P EST MOD 30 MIN: CPT | Performed by: OBSTETRICS & GYNECOLOGY

## 2023-07-31 RX ORDER — DOXYLAMINE SUCCINATE AND PYRIDOXINE HYDROCHLORIDE 10; 10 MG/1; MG/1
1 TABLET, DELAYED RELEASE ORAL 2 TIMES DAILY
Qty: 60 TABLET | Refills: 2 | Status: SHIPPED | OUTPATIENT
Start: 2023-07-31

## 2023-07-31 NOTE — PROGRESS NOTES
Assessment  26 y.o. Freddy Oliveira at [address] [place] [address] Dr presenting for amenorrhea. Pregnancy confirmed, dating consistent with LMP. IRENE 3/7/2024. Plan  Diagnoses and all orders for this visit:    First trimester pregnancy  - Prenatal vitamin  - Discussed genetic screening; recommend MFM consult  - Prenatal Nursing Intake in 2 weeks  - Prenatal H&P in 4 weeks     Vaginal bleeding during pregnancy  -     Ambulatory Referral to Obstetrics / Gynecology  -     AMB US OB < 14 weeks single or first gestation level 1    Nausea/vomiting in pregnancy  -     Diclegis 10-10 MG TBEC; Take 1 tablet by mouth 2 (two) times a day    Vulvovaginal candidiasis  -     miconazole (MONISTAT 7) 100 mg vaginal suppository; Insert 1 suppository (100 mg total) into the vagina daily at bedtime        ____________________________________________________________      Subjective   Nancy Lawrnce Parody is a 26 y.o. Freddy Oliveira with an LMP of Patient's last menstrual period was 06/01/2023 (exact date). (8w4d by LMP) who presents for amenorrhea. She notes she took a home pregnancy test and it was positive. Nausea noted, most days. Also seen by ER for 1st trimester bleeding. Resolved now. Also some vulvar itching and thicker discharge. Patient notes that this pregnancy is welcomed. She was not using contraception at the time. She reports she is certain of her LMP and that she has regular menses. She has has no vaginal bleeding since her LMP. This is her first pregnancy. The following portions of the patient's history were reviewed and updated as appropriate: allergies, current medications, past medical history, past social history, past surgical history and problem list.    Review of Systems  Review of Systems   Constitutional: Positive for fatigue. Negative for chills and fever. Respiratory: Negative for shortness of breath. Cardiovascular: Negative for chest pain and palpitations. Gastrointestinal: Positive for nausea and vomiting.  Negative for abdominal distention and abdominal pain. Genitourinary: Positive for menstrual problem (amenorrhea). Negative for dysuria, flank pain, pelvic pain, vaginal bleeding, vaginal discharge and vaginal pain. Skin: Negative for rash and wound. Neurological: Negative for dizziness, light-headedness and headaches. Objective  /78   Ht 5' 7" (1.702 m)   Wt 74.4 kg (164 lb)   LMP 06/01/2023 (Exact Date)   BMI 25.69 kg/m²       Physical Exam:  Physical Exam  Vitals reviewed. Exam conducted with a chaperone present. Constitutional:       General: She is not in acute distress. Appearance: Normal appearance. She is not ill-appearing, toxic-appearing or diaphoretic. Eyes:      General: No scleral icterus. Right eye: No discharge. Left eye: No discharge. Conjunctiva/sclera: Conjunctivae normal.   Cardiovascular:      Rate and Rhythm: Normal rate. Pulmonary:      Effort: Pulmonary effort is normal. No respiratory distress. Abdominal:      General: There is no distension. Palpations: There is no mass. Tenderness: There is no abdominal tenderness. There is no guarding or rebound. Hernia: No hernia is present. Genitourinary:     General: Normal vulva. Exam position: Lithotomy position. Labia:         Right: No rash, tenderness or lesion. Left: No rash, tenderness or lesion. Urethra: No prolapse, urethral swelling or urethral lesion. Vagina: No bleeding. Musculoskeletal:         General: No swelling. Skin:     General: Skin is warm and dry. Coloration: Skin is not jaundiced or pale. Findings: No bruising or erythema. Neurological:      Mental Status: She is alert. Psychiatric:         Mood and Affect: Mood normal.         Behavior: Behavior normal.         Thought Content:  Thought content normal.         Judgment: Judgment normal.           Reviewed  Hcg/prog  ABO/Rh

## 2023-07-31 NOTE — TELEPHONE ENCOUNTER
Called patient to schedule MFM appointment, based on referral issued to Maternal Fetal Medicine by [facility] office. Left voicemail requesting patient to call back and schedule appointment, with office number for return call [phone].

## 2023-08-01 ENCOUNTER — TELEPHONE (OUTPATIENT)
Dept: OBGYN CLINIC | Facility: MEDICAL CENTER | Age: 23
End: 2023-08-01

## 2023-08-01 DIAGNOSIS — B37.31 VAGINA, CANDIDIASIS: Primary | ICD-10-CM

## 2023-08-01 NOTE — TELEPHONE ENCOUNTER
Pharmacy called. Asking if we can switch from MONISTAT vaginal suppository to miconazole 2% cream with applicator. Also Pharmacy will be [facility], [address] [place] [address].

## 2023-08-07 ENCOUNTER — OFFICE VISIT (OUTPATIENT)
Dept: FAMILY MEDICINE CLINIC | Facility: CLINIC | Age: 23
End: 2023-08-07
Payer: MEDICARE

## 2023-08-07 ENCOUNTER — HOSPITAL ENCOUNTER (EMERGENCY)
Facility: HOSPITAL | Age: 23
Discharge: HOME | End: 2023-08-07
Attending: EMERGENCY MEDICINE
Payer: MEDICARE

## 2023-08-07 VITALS
DIASTOLIC BLOOD PRESSURE: 58 MMHG | HEART RATE: 88 BPM | TEMPERATURE: 97.9 F | SYSTOLIC BLOOD PRESSURE: 101 MMHG | OXYGEN SATURATION: 98 % | RESPIRATION RATE: 18 BRPM

## 2023-08-07 VITALS
OXYGEN SATURATION: 96 % | RESPIRATION RATE: 16 BRPM | HEART RATE: 99 BPM | BODY MASS INDEX: 25.58 KG/M2 | TEMPERATURE: 97.9 F | SYSTOLIC BLOOD PRESSURE: 104 MMHG | DIASTOLIC BLOOD PRESSURE: 80 MMHG | WEIGHT: 163 LBS | HEIGHT: 67 IN

## 2023-08-07 DIAGNOSIS — Z34.91 FIRST TRIMESTER PREGNANCY: ICD-10-CM

## 2023-08-07 DIAGNOSIS — Z11.52 ENCOUNTER FOR SCREENING FOR COVID-19: ICD-10-CM

## 2023-08-07 DIAGNOSIS — H53.9 CHANGE IN VISION: Primary | ICD-10-CM

## 2023-08-07 DIAGNOSIS — R51.9 HEADACHE: Primary | ICD-10-CM

## 2023-08-07 LAB
SARS-COV-2 AG UPPER RESP QL IA: NEGATIVE
VALID CONTROL: NORMAL

## 2023-08-07 PROCEDURE — 96365 THER/PROPH/DIAG IV INF INIT: CPT

## 2023-08-07 PROCEDURE — 99213 OFFICE O/P EST LOW 20 MIN: CPT | Performed by: NURSE PRACTITIONER

## 2023-08-07 PROCEDURE — 99283 EMERGENCY DEPT VISIT LOW MDM: CPT

## 2023-08-07 PROCEDURE — 96375 TX/PRO/DX INJ NEW DRUG ADDON: CPT

## 2023-08-07 PROCEDURE — 87811 SARS-COV-2 COVID19 W/OPTIC: CPT | Performed by: NURSE PRACTITIONER

## 2023-08-07 RX ORDER — DIPHENHYDRAMINE HYDROCHLORIDE 50 MG/ML
25 INJECTION INTRAMUSCULAR; INTRAVENOUS ONCE
Status: COMPLETED | OUTPATIENT
Start: 2023-08-07 | End: 2023-08-07

## 2023-08-07 RX ORDER — MAGNESIUM SULFATE HEPTAHYDRATE 40 MG/ML
2 INJECTION, SOLUTION INTRAVENOUS ONCE
Status: COMPLETED | OUTPATIENT
Start: 2023-08-07 | End: 2023-08-07

## 2023-08-07 RX ORDER — METOCLOPRAMIDE HYDROCHLORIDE 5 MG/ML
10 INJECTION INTRAMUSCULAR; INTRAVENOUS ONCE
Status: COMPLETED | OUTPATIENT
Start: 2023-08-07 | End: 2023-08-07

## 2023-08-07 RX ADMIN — SODIUM CHLORIDE 1000 ML: 0.9 INJECTION, SOLUTION INTRAVENOUS at 15:54

## 2023-08-07 RX ADMIN — METOCLOPRAMIDE 10 MG: 5 INJECTION, SOLUTION INTRAMUSCULAR; INTRAVENOUS at 15:51

## 2023-08-07 RX ADMIN — MAGNESIUM SULFATE HEPTAHYDRATE 2 G: 40 INJECTION, SOLUTION INTRAVENOUS at 15:59

## 2023-08-07 RX ADMIN — DIPHENHYDRAMINE HYDROCHLORIDE 25 MG: 50 INJECTION, SOLUTION INTRAMUSCULAR; INTRAVENOUS at 15:49

## 2023-08-07 NOTE — Clinical Note
Sagrario Marshall accompanied Nancy Castro to the emergency department on 8/7/2023. Return date if applicable: 55/93/1121        If you have any questions or concerns, please don't hesitate to call.       Nilton Smith RN

## 2023-08-07 NOTE — Clinical Note
Armando Cintron accompanied Nancy Castro to the emergency department on 8/7/2023. Return date if applicable: 84/79/5442        If you have any questions or concerns, please don't hesitate to call.       Nidia Dyson RN

## 2023-08-07 NOTE — PROGRESS NOTES
[facility]    ASSESSMENT AND PLAN     1. Change in vision  Assessment & Plan:  Presents with new onset headache and recent vision change:    Intermittent episodes of blurred vision, double vision, eye pain, headache. Physical exam otherwise negative. Rapid Covid negative  With early on pregnancy, advise ER evaluation at this time. Pt agreeable and reports she will go to [facility]  Prefers self transport  Appears stable at this time for personal car transport  Advise to go directly there    Orders:  -     Transfer to other facility    2. Encounter for screening for COVID-19  -     POCT Rapid Covid Ag         SUBJECTIVE       Patient ID: Caleb Hennessy is a 26 y.o. female. Chief Complaint   Patient presents with   • HA, blurry vision,        HISTORY OF PRESENT ILLNESS    Presents with c/o dizziness and vision issue. First episode was Saturday. Started with "seeing dots". On/off episodes. Occurring again on Sunday, again, in short, random intervals. Noted some blurriness this morning while driving to work. Started seeing double once she got to work. That lasted an hour or two. Felt dizzy and off blanace. Told work she had to leave d/t symptoms. Went home and slept 2 - 2.5 hours and left to come here. Currently has a headache. Double vision has resolved, but now with intermittent blurred vision. Is currently 9 weeks pregnant. Recent started medication for nausea through OB-Gyn: Doxylamine/pyrioxine. Her first dose was 7/31 and reports has been taking it regularly since. She denies any recent injury, fall or traumas. Has never had an eye check up, but reports never having visual problems in the past. She works at [employer]. Is a . Reports several residents currently have Covid. She reports no other symptoms. She has been eating and drinking despite the nausea. Reports eating a full breakfast this am. Has not been vomiting.          The following portions of the patient's history were reviewed and updated as appropriate: allergies, current medications, past family history, past medical history, past social history, past surgical history, and problem list.    REVIEW OF SYSTEMS  Review of Systems   Constitutional: Positive for appetite change (decreased d/t nausea with pregnancy) and fatigue. Negative for activity change and fever. HENT: Negative. Eyes: Positive for photophobia and visual disturbance (as  in HPI). Negative for pain and discharge. Respiratory: Negative. Cardiovascular: Negative. Gastrointestinal: Positive for nausea. Negative for abdominal pain, diarrhea and vomiting. Genitourinary: Negative. Neurological: Positive for dizziness (secondary to blurred vision). Negative for headaches. OBJECTIVE      VITAL SIGNS  /80 (BP Location: Left arm, Patient Position: Sitting, Cuff Size: Adult)   Pulse 99   Temp 97.9 °F (36.6 °C) (Temporal)   Resp 16   Ht 5' 6.93" (1.7 m)   Wt 73.9 kg (163 lb)   LMP 06/01/2023 (Exact Date)   SpO2 96%   BMI 25.58 kg/m²     CURRENT MEDICATIONS    Current Outpatient Medications:   •  Diclegis 10-10 MG TBEC, Take 1 tablet by mouth 2 (two) times a day, Disp: 60 tablet, Rfl: 2  •  Prenatal Vit-Fe Fumarate-FA (PRENATAL PO), Take by mouth, Disp: , Rfl:   •  linaCLOtide (Linzess) 72 MCG CAPS, Take 72 mcg by mouth daily before breakfast (Patient not taking: Reported on 6/26/2023), Disp: 30 capsule, Rfl: 5  •  miconazole (MONISTAT-7) 2 % vaginal cream, Insert 1 applicator into the vagina daily at bedtime for 7 days (Patient not taking: Reported on 8/7/2023), Disp: 45 g, Rfl: 0  •  predniSONE 10 mg tablet, 3-3-6-4-3-2-1 taper with food. (Patient not taking: Reported on 7/31/2023), Disp: 24 tablet, Rfl: 0  •  triamcinolone (KENALOG) 0.1 % cream, Apply topically 2 (two) times a day (Patient not taking: Reported on 7/31/2023), Disp: 30 g, Rfl: 0      PHYSICAL EXAMINATION   Physical Exam  Vitals and nursing note reviewed.    Constitutional: General: She is not in acute distress. Appearance: Normal appearance. She is not ill-appearing. HENT:      Head: Normocephalic. Right Ear: Tympanic membrane and ear canal normal.      Left Ear: Tympanic membrane and ear canal normal.      Nose: Nose normal.   Eyes:      Extraocular Movements: Extraocular movements intact. Conjunctiva/sclera: Conjunctivae normal.      Comments: Eye movements normal, but pt c/o pain with lateral movments   Cardiovascular:      Rate and Rhythm: Normal rate and regular rhythm. Pulmonary:      Effort: Pulmonary effort is normal. No respiratory distress. Breath sounds: Normal breath sounds and air entry. Neurological:      General: No focal deficit present. Mental Status: She is alert and oriented to person, place, and time. Coordination: Coordination is intact. Gait: Gait is intact.    Psychiatric:         Attention and Perception: Attention normal.         Mood and Affect: Mood normal.         Behavior: Behavior normal.

## 2023-08-07 NOTE — ED PROVIDER NOTES
Received sign out from prior team. Reviewed plan of care with them and will accept care of patient. Patient is feeling better already and mag is still infusing. Plan re-evaluate and discharge. Time reflects when diagnosis was documented in both MDM as applicable and the Disposition within this note     Time User Action Codes Description Comment    8/7/2023  4:32 PM Mayco Craig Add [R51.9] Headache     8/7/2023  4:32 PM Mayco Craig Add [Z34.91] First trimester pregnancy       ED Disposition     ED Disposition   Discharge    Condition   Stable    Date/Time   Mon Aug 7, 2023  4:32 PM    Comment   Nancy Castro discharge to home/self care.                Follow-up Information     Follow up With Specialties Details Why Contact Info    Kendall Damon DO Family Medicine Schedule an appointment as soon as possible for a visit   [address]  [phone]                   Mayco Craig DO  08/07/23 [id number]

## 2023-08-07 NOTE — ASSESSMENT & PLAN NOTE
Presents with new onset headache and recent vision change:    Intermittent episodes of blurred vision, double vision, eye pain, headache. Physical exam otherwise negative. Rapid Covid negative  With early on pregnancy, advise ER evaluation at this time.    Pt agreeable and reports she will go to [facility]  Prefers self transport  Appears stable at this time for personal car transport  Advise to go directly there

## 2023-08-07 NOTE — Clinical Note
Lisa Roberts was seen and treated in our emergency department on 8/7/2023. No restrictions            Diagnosis:     Nancy  may return to work on return date. She may return on this date: 08/08/2023         If you have any questions or concerns, please don't hesitate to call.       Elaine Ventura RN    ______________________________           _______________          _______________  Hospital Representative                              Date                                Time

## 2023-08-07 NOTE — ED PROVIDER NOTES
History  Chief Complaint   Patient presents with   • Headache     Headache and intermittent blurry vision x3 days. 9 weeks pregnant. Sent by PCP for further evaluation. 81-year-old female with history of anxiety, autism, currently 9 weeks pregnant with EDC of 3/11/2024  presents to the emergency department with headache. Patient states she has been having intermittent headaches over the last 3 days. No past history of migraine headaches. The headache is frontal and feels like pressure. Associated with photophobia, occasional blurred vision. She did have an episode of double vision today while at work. She went home and slept. She still has the headache but the double vision has resolved. She has had nausea treated by her OB/GYN with diclegis. She has had no fevers, URI symptoms, neck stiffness, head trauma. She went to her family physician today and was referred here for further evaluation. History provided by:  Patient   used: No    Headache  Pain location:  Frontal  Quality: pressure.   Radiates to:  Does not radiate  Severity currently:  10/10  Severity at highest:  10/10  Onset quality:  Gradual  Duration:  3 days  Timing:  Intermittent  Progression:  Unchanged  Chronicity:  New  Similar to prior headaches: no    Context: not activity, not exposure to bright light, not coughing and not loud noise    Relieved by:  Nothing  Worsened by:  Light  Ineffective treatments:  Acetaminophen  Associated symptoms: blurred vision, nausea and photophobia    Associated symptoms: no abdominal pain, no back pain, no congestion, no cough, no diarrhea, no dizziness, no drainage, no ear pain, no eye pain, no facial pain, no fatigue, no fever, no focal weakness, no hearing loss, no loss of balance, no myalgias, no near-syncope, no neck pain, no neck stiffness, no numbness, no paresthesias, no seizures, no sinus pressure, no sore throat, no swollen glands, no syncope, no tingling, no URI, no visual change, no vomiting and no weakness        Prior to Admission Medications   Prescriptions Last Dose Informant Patient Reported? Taking? Diclegis 10-10 MG TBEC   No No   Sig: Take 1 tablet by mouth 2 (two) times a day   Prenatal Vit-Fe Fumarate-FA (PRENATAL PO)   Yes No   Sig: Take by mouth   linaCLOtide (Linzess) 72 MCG CAPS   No No   Sig: Take 72 mcg by mouth daily before breakfast   Patient not taking: Reported on 2023   miconazole (MONISTAT-7) 2 % vaginal cream   No No   Sig: Insert 1 applicator into the vagina daily at bedtime for 7 days   Patient not taking: Reported on 2023   predniSONE 10 mg tablet   No No   Si9-1-9-4-3-2-1 taper with food. Patient not taking: Reported on 2023   triamcinolone (KENALOG) 0.1 % cream   No No   Sig: Apply topically 2 (two) times a day   Patient not taking: Reported on 2023      Facility-Administered Medications: None       Past Medical History:   Diagnosis Date   • Anxiety    • Autism    • Concussion 2018    Tripped over recliner       Past Surgical History:   Procedure Laterality Date   • INSERTION OF INTRAUTERINE DEVICE (IUD)  2019   • REMOVAL VAGINAL SEPTUM         Family History   Problem Relation Age of Onset   • Breast cancer Neg Hx    • Uterine cancer Neg Hx    • Ovarian cancer Neg Hx    • Colon cancer Neg Hx      I have reviewed and agree with the history as documented. E-Cigarette/Vaping   • E-Cigarette Use Former User      E-Cigarette/Vaping Substances   • Nicotine No    • THC No    • CBD Yes    • Flavoring No    • Other No    • Unknown No      Social History     Tobacco Use   • Smoking status: Never   • Smokeless tobacco: Never   Vaping Use   • Vaping Use: Former   • Substances: CBD   Substance Use Topics   • Alcohol use: Not Currently     Comment: social   • Drug use: Yes     Types: Marijuana     Comment: medical marijuana       Review of Systems   Constitutional: Negative. Negative for chills, diaphoresis, fatigue and fever. HENT: Negative. Negative for congestion, ear pain, hearing loss, postnasal drip, rhinorrhea, sinus pressure and sore throat. Eyes: Positive for blurred vision, photophobia and visual disturbance. Negative for pain, discharge, redness and itching. Respiratory: Negative. Negative for apnea, cough, chest tightness, shortness of breath and wheezing. Cardiovascular: Negative for chest pain, palpitations, leg swelling, syncope and near-syncope. Gastrointestinal: Positive for nausea. Negative for abdominal pain, diarrhea and vomiting. Endocrine: Negative. Genitourinary: Negative. Negative for flank pain, frequency and urgency. Musculoskeletal: Negative. Negative for back pain, myalgias, neck pain and neck stiffness. Skin: Negative. Allergic/Immunologic: Negative. Neurological: Positive for headaches. Negative for dizziness, tremors, focal weakness, seizures, syncope, facial asymmetry, speech difficulty, weakness, light-headedness, numbness, paresthesias and loss of balance. Hematological: Negative. All other systems reviewed and are negative. Physical Exam  Physical Exam  Vitals and nursing note reviewed. Constitutional:       General: She is awake. She is not in acute distress. Appearance: Normal appearance. She is well-developed and normal weight. She is not ill-appearing, toxic-appearing or diaphoretic. HENT:      Head: Normocephalic and atraumatic. Right Ear: Tympanic membrane and external ear normal.      Left Ear: Tympanic membrane and external ear normal.      Nose: Nose normal.      Mouth/Throat:      Mouth: Mucous membranes are moist.      Pharynx: No oropharyngeal exudate or posterior oropharyngeal erythema. Eyes:      General: Lids are normal. No visual field deficit or scleral icterus. Right eye: No discharge. Left eye: No discharge. Extraocular Movements: Extraocular movements intact. Right eye: No nystagmus.       Left eye: No nystagmus. Conjunctiva/sclera: Conjunctivae normal.      Pupils: Pupils are equal, round, and reactive to light. Neck:      Thyroid: No thyromegaly. Vascular: No JVD. Trachea: No tracheal deviation. Cardiovascular:      Rate and Rhythm: Normal rate and regular rhythm. Heart sounds: Normal heart sounds. No murmur heard. No friction rub. No gallop. Pulmonary:      Effort: Pulmonary effort is normal. No respiratory distress. Breath sounds: Normal breath sounds. No stridor. No wheezing, rhonchi or rales. Chest:      Chest wall: No tenderness. Abdominal:      General: Abdomen is flat. There is no distension. Palpations: Abdomen is soft. There is no mass. Tenderness: There is no abdominal tenderness. Hernia: No hernia is present. Musculoskeletal:         General: No tenderness or deformity. Cervical back: Full passive range of motion without pain, normal range of motion and neck supple. No rigidity or tenderness. Right lower leg: No edema. Left lower leg: No edema. Lymphadenopathy:      Cervical: No cervical adenopathy. Skin:     General: Skin is warm and dry. Coloration: Skin is not jaundiced or pale. Findings: No bruising, erythema, lesion or rash. Neurological:      General: No focal deficit present. Mental Status: She is alert and oriented to person, place, and time. Cranial Nerves: No cranial nerve deficit. Motor: No weakness or abnormal muscle tone. Coordination: Coordination normal.      Deep Tendon Reflexes: Reflexes are normal and symmetric. Reflexes normal.   Psychiatric:         Mood and Affect: Mood normal.         Behavior: Behavior is cooperative.          Vital Signs  ED Triage Vitals [08/07/23 1427]   Temperature Pulse Respirations Blood Pressure SpO2   97.9 °F (36.6 °C) 84 18 108/65 100 %      Temp src Heart Rate Source Patient Position - Orthostatic VS BP Location FiO2 (%)   -- -- -- -- --      Pain Score       --           Vitals:    08/07/23 1427   BP: 108/65   Pulse: 84         Visual Acuity      ED Medications  Medications   sodium chloride 0.9 % bolus 1,000 mL (has no administration in time range)   metoclopramide (REGLAN) injection 10 mg (has no administration in time range)   diphenhydrAMINE (BENADRYL) injection 25 mg (has no administration in time range)   magnesium sulfate 2 g/50 mL IVPB (premix) 2 g (has no administration in time range)       Diagnostic Studies  Results Reviewed     None                 No orders to display              Procedures  Procedures         ED Course                               SBIRT 22yo+    Flowsheet Row Most Recent Value   Initial Alcohol Screen: US AUDIT-C     1. How often do you have a drink containing alcohol? 0 Filed at: 08/07/2023 1428   2. How many drinks containing alcohol do you have on a typical day you are drinking? 0 Filed at: 08/07/2023 1428   3a. Male UNDER 65: How often do you have five or more drinks on one occasion? 0 Filed at: 08/07/2023 1428   3b. FEMALE Any Age, or MALE 65+: How often do you have 4 or more drinks on one occassion? 0 Filed at: 08/07/2023 1428   Audit-C Score 0 Filed at: 08/07/2023 1428   DAREK: How many times in the past year have you. .. Used an illegal drug or used a prescription medication for non-medical reasons? Never Filed at: 08/07/2023 1428                    Medical Decision Making  81-year-old female presents to the ED with headache which has been on and off for the last 3 days. The headache is frontal and described as pressure. She states she has light sensitivity. She has had nausea but she has had this throughout her pregnancy. She is 9 weeks pregnant with her first child. She has had some episodes of intermittent blurred vision and had an episode of double vision today but that resolved. She had no infectious symptoms. No fevers, URI symptoms, neck stiffness. No trauma. She took Tylenol without relief.   On exam she is alert in no distress. Her neuro exam is completely normal.  Suspect this may be new onset tension or migraine headaches associated with pregnancy. Highly doubt meningitis given lack of infectious symptoms. Doubt bleed given intermittent headaches over the last 3 days and normal neuro exam.  Also doubt headache associated with preeclampsia as she is only in her first trimester. We will do modified migraine cocktail with Reglan, Benadryl. We will also give magnesium and reassess. Risk  Prescription drug management. Disposition  Final diagnoses:   None     ED Disposition     None      Follow-up Information    None         Patient's Medications   Discharge Prescriptions    No medications on file       No discharge procedures on file.     PDMP Review     None          ED Provider  Electronically Signed by

## 2023-08-07 NOTE — Clinical Note
Eloisa Merida was seen and treated in our emergency department on 8/7/2023. No restrictions    ? ? Diagnosis: ? Nancy  may return to work on return date. She may return on this date: 08/08/2023    ? If you have any questions or concerns, please don't hesitate to call.       Garett Marcial, DO    ______________________________           _______________          _______________  Hospital Representative                              Date                                Time Ankyloglossia

## 2023-08-08 ENCOUNTER — VBI (OUTPATIENT)
Dept: ADMINISTRATIVE | Facility: OTHER | Age: 23
End: 2023-08-08

## 2023-08-08 NOTE — TELEPHONE ENCOUNTER
Lyssa Lewis    ED Visit Information     Ed visit date: Headache   Diagnosis Description: 8/7/2023  In Network? Yes [address]  Discharge status: Home  Discharged with meds ? No  Number of ED visits to date: 1  ED Severity:3     Outreach Information    Outreach successful: No 2  Date letter mailed:n/a  Date Finalized:8/10/2023  Care Coordination    Follow up appointment with pcp: yes yes  Transportation issues ?  No    [employer] type: 3 Day Outreach  [facility] PCP: Yes  Transportation: Friend/Family Transport  Called PCP first?: No  Feels able to call PCP for urgent problems ?: Yes  Understands what emergencies can be handled by PCP ?: Yes  Ever any problems getting appointment with PCP for minor emergency/urgency problems?: Yes  Practice Contacted Patient ?: No  Pt had ED follow up with pcp/staff ?: No    Seen for follow-up out of network ?: No  Urgent care Education?: No  08/10/2023 04:25 PM EDT by Lina Lundberg 08/10/2023 04:25 PM EDT by Nancy Ordaz (Self) [phone] ( [phone] (Mobile)  [phone] (Mobile) Remove  - Call Complete

## 2023-08-09 ENCOUNTER — APPOINTMENT (OUTPATIENT)
Dept: LAB | Facility: MEDICAL CENTER | Age: 23
End: 2023-08-09
Payer: MEDICARE

## 2023-08-09 ENCOUNTER — INITIAL PRENATAL (OUTPATIENT)
Dept: OBGYN CLINIC | Facility: MEDICAL CENTER | Age: 23
End: 2023-08-09
Payer: MEDICARE

## 2023-08-09 ENCOUNTER — TELEPHONE (OUTPATIENT)
Dept: PSYCHIATRY | Facility: CLINIC | Age: 23
End: 2023-08-09

## 2023-08-09 VITALS
HEIGHT: 68 IN | WEIGHT: 164.4 LBS | SYSTOLIC BLOOD PRESSURE: 110 MMHG | BODY MASS INDEX: 24.92 KG/M2 | DIASTOLIC BLOOD PRESSURE: 76 MMHG

## 2023-08-09 DIAGNOSIS — Z34.01 ENCOUNTER FOR SUPERVISION OF NORMAL FIRST PREGNANCY IN FIRST TRIMESTER: ICD-10-CM

## 2023-08-09 DIAGNOSIS — Z13.31 POSITIVE DEPRESSION SCREENING: Primary | ICD-10-CM

## 2023-08-09 LAB
BACTERIA UR QL AUTO: ABNORMAL /HPF
BASOPHILS # BLD AUTO: 0.05 THOUSANDS/ÂΜL (ref 0–0.1)
BASOPHILS NFR BLD AUTO: 1 % (ref 0–1)
BILIRUB UR QL STRIP: NEGATIVE
CLARITY UR: CLEAR
COLOR UR: ABNORMAL
EOSINOPHIL # BLD AUTO: 0.14 THOUSAND/ÂΜL (ref 0–0.61)
EOSINOPHIL NFR BLD AUTO: 2 % (ref 0–6)
ERYTHROCYTE [DISTWIDTH] IN BLOOD BY AUTOMATED COUNT: 12.7 % (ref 11.6–15.1)
GLUCOSE UR STRIP-MCNC: NEGATIVE MG/DL
HCT VFR BLD AUTO: 35.9 % (ref 34.8–46.1)
HGB BLD-MCNC: 12.1 G/DL (ref 11.5–15.4)
HGB UR QL STRIP.AUTO: NEGATIVE
IMM GRANULOCYTES # BLD AUTO: 0.02 THOUSAND/UL (ref 0–0.2)
IMM GRANULOCYTES NFR BLD AUTO: 0 % (ref 0–2)
KETONES UR STRIP-MCNC: NEGATIVE MG/DL
LEUKOCYTE ESTERASE UR QL STRIP: NEGATIVE
LYMPHOCYTES # BLD AUTO: 2.76 THOUSANDS/ÂΜL (ref 0.6–4.47)
LYMPHOCYTES NFR BLD AUTO: 36 % (ref 14–44)
MCH RBC QN AUTO: 28.2 PG (ref 26.8–34.3)
MCHC RBC AUTO-ENTMCNC: 33.7 G/DL (ref 31.4–37.4)
MCV RBC AUTO: 84 FL (ref 82–98)
MONOCYTES # BLD AUTO: 0.53 THOUSAND/ÂΜL (ref 0.17–1.22)
MONOCYTES NFR BLD AUTO: 7 % (ref 4–12)
MUCOUS THREADS UR QL AUTO: ABNORMAL
NEUTROPHILS # BLD AUTO: 4.17 THOUSANDS/ÂΜL (ref 1.85–7.62)
NEUTS SEG NFR BLD AUTO: 54 % (ref 43–75)
NITRITE UR QL STRIP: NEGATIVE
NON-SQ EPI CELLS URNS QL MICRO: ABNORMAL /HPF
NRBC BLD AUTO-RTO: 0 /100 WBCS
PH UR STRIP.AUTO: 7 [PH]
PLATELET # BLD AUTO: 315 THOUSANDS/UL (ref 149–390)
PMV BLD AUTO: 10 FL (ref 8.9–12.7)
PROT UR STRIP-MCNC: ABNORMAL MG/DL
RBC # BLD AUTO: 4.29 MILLION/UL (ref 3.81–5.12)
RBC #/AREA URNS AUTO: ABNORMAL /HPF
SP GR UR STRIP.AUTO: 1.02 (ref 1–1.03)
UROBILINOGEN UR STRIP-ACNC: <2 MG/DL
WBC # BLD AUTO: 7.67 THOUSAND/UL (ref 4.31–10.16)
WBC #/AREA URNS AUTO: ABNORMAL /HPF

## 2023-08-09 PROCEDURE — 81001 URINALYSIS AUTO W/SCOPE: CPT

## 2023-08-09 PROCEDURE — 86706 HEP B SURFACE ANTIBODY: CPT

## 2023-08-09 PROCEDURE — 36415 COLL VENOUS BLD VENIPUNCTURE: CPT

## 2023-08-09 PROCEDURE — 86762 RUBELLA ANTIBODY: CPT

## 2023-08-09 PROCEDURE — T1001 NURSING ASSESSMENT/EVALUATN: HCPCS

## 2023-08-09 PROCEDURE — 86780 TREPONEMA PALLIDUM: CPT

## 2023-08-09 PROCEDURE — 87147 CULTURE TYPE IMMUNOLOGIC: CPT

## 2023-08-09 PROCEDURE — 87340 HEPATITIS B SURFACE AG IA: CPT

## 2023-08-09 PROCEDURE — 87086 URINE CULTURE/COLONY COUNT: CPT

## 2023-08-09 PROCEDURE — 87389 HIV-1 AG W/HIV-1&-2 AB AG IA: CPT

## 2023-08-09 PROCEDURE — 85025 COMPLETE CBC W/AUTO DIFF WBC: CPT

## 2023-08-09 PROCEDURE — 86803 HEPATITIS C AB TEST: CPT

## 2023-08-09 NOTE — TELEPHONE ENCOUNTER
Contacted pt in regards to routine referral and to get placed on proper wait list. LVM for pt to contact intake dept.
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
## 2023-08-10 ENCOUNTER — ROUTINE PRENATAL (OUTPATIENT)
Dept: OBGYN CLINIC | Facility: MEDICAL CENTER | Age: 23
End: 2023-08-10
Payer: MEDICARE

## 2023-08-10 VITALS — SYSTOLIC BLOOD PRESSURE: 104 MMHG | DIASTOLIC BLOOD PRESSURE: 62 MMHG

## 2023-08-10 DIAGNOSIS — Z34.91 FIRST TRIMESTER PREGNANCY: ICD-10-CM

## 2023-08-10 DIAGNOSIS — N89.8 VAGINAL DISCHARGE: ICD-10-CM

## 2023-08-10 DIAGNOSIS — B37.31 VAGINAL CANDIDIASIS: Primary | ICD-10-CM

## 2023-08-10 LAB
BV WHIFF TEST VAG QL: NEGATIVE
CLUE CELLS SPEC QL WET PREP: NEGATIVE
HBV SURFACE AB SER-ACNC: <3 MIU/ML
HBV SURFACE AG SER QL: NORMAL
HCV AB SER QL: NORMAL
HIV 1+2 AB+HIV1 P24 AG SERPL QL IA: NORMAL
HIV 2 AB SERPL QL IA: NORMAL
HIV1 AB SERPL QL IA: NORMAL
HIV1 P24 AG SERPL QL IA: NORMAL
PH SMN: 4 [PH]
RUBV IGG SERPL IA-ACNC: 21.6 IU/ML
SL AMB POCT WET MOUNT: ABNORMAL
T VAGINALIS VAG QL WET PREP: NEGATIVE
TREPONEMA PALLIDUM IGG+IGM AB [PRESENCE] IN SERUM OR PLASMA BY IMMUNOASSAY: NORMAL
YEAST VAG QL WET PREP: ABNORMAL

## 2023-08-10 PROCEDURE — 87660 TRICHOMONAS VAGIN DIR PROBE: CPT | Performed by: ADVANCED PRACTICE MIDWIFE

## 2023-08-10 PROCEDURE — 87210 SMEAR WET MOUNT SALINE/INK: CPT | Performed by: ADVANCED PRACTICE MIDWIFE

## 2023-08-10 PROCEDURE — 87491 CHLMYD TRACH DNA AMP PROBE: CPT | Performed by: ADVANCED PRACTICE MIDWIFE

## 2023-08-10 PROCEDURE — 87591 N.GONORRHOEAE DNA AMP PROB: CPT | Performed by: ADVANCED PRACTICE MIDWIFE

## 2023-08-10 PROCEDURE — 87480 CANDIDA DNA DIR PROBE: CPT | Performed by: ADVANCED PRACTICE MIDWIFE

## 2023-08-10 PROCEDURE — 87510 GARDNER VAG DNA DIR PROBE: CPT | Performed by: ADVANCED PRACTICE MIDWIFE

## 2023-08-10 PROCEDURE — 99213 OFFICE O/P EST LOW 20 MIN: CPT | Performed by: ADVANCED PRACTICE MIDWIFE

## 2023-08-10 NOTE — PROGRESS NOTES
A/P:  26 y.o. yo female with:  Diagnoses and all orders for this visit:    Vaginal candidiasis  -     miconazole (MONISTAT) 200 mg vaginal suppository; Insert 1 suppository (200 mg total) into the vagina daily at bedtime  -     POCT wet mount    Vaginal discharge  -     VAGINOSIS DNA PROBE (AFFIRM)  -     Chlamydia/GC amplified DNA by PCR  -     POCT wet mount    First trimester pregnancy  -     VAGINOSIS DNA PROBE (AFFIRM)  -     Chlamydia/GC amplified DNA by PCR        1. Wet prep was obtained. Cultures for gonorrhea and chlamydia were collected. Patient is early pregnant  Affirm was obtained. 2.  Will contact pt with results. 3. Patient was treated for yeast- symptomatic. few hyphae noted and is symptomatic. Is only 2 days post 7 day treatment of monistat. CHIEF COMPLAINT: yeast symptoms    HISTORY OF PRESENT ILLNESS:  Irais Gutiérrez is a 26 y.o. yo  female who presents for vaginal discharge. She describes the discharge as greenish. Her symptoms started 8 days ago. Show no change show no change. Did monistat nightly from 2023 and last dose was Tuesday evening. Alleviating factors: none  Aggravating factors: none    ROS:   She denies hematuria, dysuria, constipation, diarrhea, fever, chills, nausea or emesis.     Past Medical History:   Diagnosis Date   • Anxiety    • Autism    • Concussion 2018    Tripped over recliner   • Depression        Past Medical History:   Diagnosis Date   • Anxiety    • Autism    • Concussion 2018    Tripped over recliner   • Depression        Social History     Socioeconomic History   • Marital status: Single     Spouse name: Not on file   • Number of children: Not on file   • Years of education: Not on file   • Highest education level: Not on file   Occupational History   • Not on file   Tobacco Use   • Smoking status: Never   • Smokeless tobacco: Never   Vaping Use   • Vaping Use: Former   • Substances: CBD   Substance and Sexual Activity   • Alcohol use: Not Currently     Comment: social   • Drug use: Yes     Types: Marijuana     Comment: medical marijuana - uses once a week as needed   • Sexual activity: Yes     Partners: Male     Birth control/protection: None   Other Topics Concern   • Not on file   Social History Narrative    Sexual Abuse Have you been sexually abused? no    Exercise: Occassionally    Domestic violence: No    History of drug/alcohol abuse denies    Sexual Activity sexually active >1 partner in last year         Social Determinants of Health     Financial Resource Strain: Not on file   Food Insecurity: Not on file   Transportation Needs: Not on file   Physical Activity: Not on file   Stress: Not on file   Social Connections: Not on file   Intimate Partner Violence: Not on file   Housing Stability: Not on file       /62   LMP 06/01/2023 (Exact Date)     GEN: The patient was alert and oriented x3, pleasant well-appearing female in no acute distress. Pelvic: Normal appearing external female genitalia, normal vaginal epithelium, normalappearing cervix. positive discharge noted. Wet Prep: few hyphae noted and is symptomatic. Is only 2 days post 7 day treatment of monistat.

## 2023-08-11 LAB
BACTERIA UR CULT: ABNORMAL
BACTERIA UR CULT: ABNORMAL
C TRACH DNA SPEC QL NAA+PROBE: NEGATIVE
CANDIDA RRNA VAG QL PROBE: NEGATIVE
G VAGINALIS RRNA GENITAL QL PROBE: NEGATIVE
N GONORRHOEA DNA SPEC QL NAA+PROBE: NEGATIVE
T VAGINALIS RRNA GENITAL QL PROBE: NEGATIVE

## 2023-08-14 NOTE — TELEPHONE ENCOUNTER
Contacted pt in regards to routine referral and to get placed on proper wait list. Not able to take pt insurance; advised pt and closing referral.

## 2023-08-23 ENCOUNTER — TELEPHONE (OUTPATIENT)
Dept: OBGYN CLINIC | Facility: MEDICAL CENTER | Age: 23
End: 2023-08-23

## 2023-08-23 NOTE — TELEPHONE ENCOUNTER
Pt contacted regarding PA for monistat cream, pt stated she no longer needs the medication and that her symptoms have cleared.

## 2023-08-24 ENCOUNTER — INITIAL PRENATAL (OUTPATIENT)
Dept: OBGYN CLINIC | Facility: MEDICAL CENTER | Age: 23
End: 2023-08-24
Payer: MEDICARE

## 2023-08-24 VITALS — DIASTOLIC BLOOD PRESSURE: 60 MMHG | WEIGHT: 164 LBS | BODY MASS INDEX: 25.31 KG/M2 | SYSTOLIC BLOOD PRESSURE: 104 MMHG

## 2023-08-24 DIAGNOSIS — Z34.01 ENCOUNTER FOR SUPERVISION OF NORMAL FIRST PREGNANCY IN FIRST TRIMESTER: Primary | ICD-10-CM

## 2023-08-24 PROCEDURE — 99213 OFFICE O/P EST LOW 20 MIN: CPT | Performed by: STUDENT IN AN ORGANIZED HEALTH CARE EDUCATION/TRAINING PROGRAM

## 2023-08-24 RX ORDER — MAGNESIUM 30 MG
30 TABLET ORAL 2 TIMES DAILY
COMMUNITY

## 2023-08-24 NOTE — PROGRESS NOTES
Initial Prenatal Visit  [facility] [address] [place] [address]    Assessment/Plan:  Jitendra Lebron is a 26y.o. year old  at 12w0d who presents for initial prenatal visit. Supervision of normal pregnancy  - Prenatal labs reviewed and normal.  Blood type: A positive. - Aneuploidy screening discussed. Patient opts for cell free DNA testing.  - Routine cervical cancer screening: Pap due but patient declines and defers to after pregnancy. - Routine STI Screening: GC/Chlamydia sent today. HIV/Hep B/Syphilis ordered in prenatal panel.  - Patient Education: Patient was counseled regarding diet, exercise, weight gain, foods to avoid, vaccines in pregnancy, aneuploidy screening, travel precautions to include seat belt use and VTE risk reduction. She has been provided our pregnancy packet which includes how and when to contact providers, medication recommendations, dietary suggestions, breastfeeding information as well as websites for additional information, hospital and delivery concerns. Additional Pregnancy Problems:   1. Encounter for supervision of normal first pregnancy in first trimester          Subjective:   CC:  Desires prenatal care  Nancy Selma Marshall is a 26 y.o. Rofranki Browertty female who presents for prenatal care. Pregnancy ROS: Denies leakage of fluid, pelvic pain, or vaginal bleeding. Reports nausea/vomiting. The following portions of the patient's history were reviewed and updated as appropriate: allergies, current medications, past family history, past medical history, obstetric history, gynecologic history, past social history, past surgical history and problem list.      Objective:  /60   Wt 74.4 kg (164 lb)   LMP 2023 (Exact Date)   BMI 25.31 kg/m²   Pregravid Weight/BMI: Pregravid weight not on file (BMI Could not be calculated)  Current Weight: 74.4 kg (164 lb)   Total Weight Gain: Not found.    Pre- Vitals    Flowsheet Row Most Recent Value   Prenatal Assessment    Fetal Heart Rate 164   Prenatal Vitals    Blood Pressure 104/60   Weight - Scale 74.4 kg (164 lb)   Urine Albumin/Glucose    Dilation/Effacement/Station    Vaginal Drainage    Edema          General: Well appearing, no distress  Respiratory: Normal respiratory rate, lungs clear to auscultation, no wheezing or rales  Cardiovascular: Regular rate and rhythm, no murmurs, rubs, or gallops  Breasts: Normal bilaterally, nontender without masses, asymmetry, or nipple discharge. Abdomen: Soft, gravid, nontender  Extremities: Warm and well perfused. Non tender. No edema.     Kassi Boyd MD  [address] [facility]  8/24/2023 2:51 PM

## 2023-08-28 ENCOUNTER — ROUTINE PRENATAL (OUTPATIENT)
Dept: PERINATAL CARE | Facility: OTHER | Age: 23
End: 2023-08-28
Payer: MEDICARE

## 2023-08-28 VITALS
BODY MASS INDEX: 25.33 KG/M2 | HEIGHT: 67 IN | SYSTOLIC BLOOD PRESSURE: 102 MMHG | HEART RATE: 105 BPM | WEIGHT: 161.4 LBS | DIASTOLIC BLOOD PRESSURE: 60 MMHG

## 2023-08-28 DIAGNOSIS — O99.320 MARIJUANA USE DURING PREGNANCY: ICD-10-CM

## 2023-08-28 DIAGNOSIS — Z36.82 NUCHAL TRANSLUCENCY OF FETUS ON PRENATAL ULTRASOUND: ICD-10-CM

## 2023-08-28 DIAGNOSIS — Z13.79 GENETIC SCREENING: ICD-10-CM

## 2023-08-28 DIAGNOSIS — Z34.91 FIRST TRIMESTER PREGNANCY: ICD-10-CM

## 2023-08-28 DIAGNOSIS — Q51.28 SEPTATE UTERUS AFFECTING PREGNANCY IN FIRST TRIMESTER: ICD-10-CM

## 2023-08-28 DIAGNOSIS — Z3A.12 12 WEEKS GESTATION OF PREGNANCY: Primary | ICD-10-CM

## 2023-08-28 DIAGNOSIS — F41.9 ANXIETY AND DEPRESSION: ICD-10-CM

## 2023-08-28 DIAGNOSIS — F90.2 ADHD (ATTENTION DEFICIT HYPERACTIVITY DISORDER), COMBINED TYPE: ICD-10-CM

## 2023-08-28 DIAGNOSIS — F32.A ANXIETY AND DEPRESSION: ICD-10-CM

## 2023-08-28 DIAGNOSIS — F84.0 AUTISM SPECTRUM DISORDER: ICD-10-CM

## 2023-08-28 DIAGNOSIS — O34.01 SEPTATE UTERUS AFFECTING PREGNANCY IN FIRST TRIMESTER: ICD-10-CM

## 2023-08-28 DIAGNOSIS — F12.90 MARIJUANA USE DURING PREGNANCY: ICD-10-CM

## 2023-08-28 PROCEDURE — 36415 COLL VENOUS BLD VENIPUNCTURE: CPT | Performed by: OBSTETRICS & GYNECOLOGY

## 2023-08-28 PROCEDURE — 76813 OB US NUCHAL MEAS 1 GEST: CPT | Performed by: OBSTETRICS & GYNECOLOGY

## 2023-08-28 PROCEDURE — 99204 OFFICE O/P NEW MOD 45 MIN: CPT | Performed by: OBSTETRICS & GYNECOLOGY

## 2023-08-28 NOTE — LETTER
August 29, 2023     [facility], [address]  [place] [address]    Patient: Jenaro Lehman   YOB: 2000   Date of Visit: 8/28/2023       Dear Dr. Toribio Diaz: Thank you for referring Antolin Stallworth to me for evaluation. Below are my notes for this consultation. If you have questions, please do not hesitate to call me. I look forward to following your patient along with you. Sincerely,        Ajit Marcial MD        CC: No Recipients    Ajit Marcial MD  8/29/2023  1:16 PM  Sign when Signing Visit  A fetal ultrasound was completed. See Ob procedures in Epic for an interpretation and recommendations. Do not hesitate to contact us in [facility] if you have questions. Johan Marina MD, [address] [facility]  Maternal Fetal Medicine

## 2023-08-28 NOTE — PROGRESS NOTES
Patient chose to have [facility] Non-invasive Prenatal Screen with fetal sex. Patient given brochure and is aware [facility] will contact their insurance and coordinate coverage. Patient made aware she will need to respond to text message or e-mail from [address] within 2 business days or testing will be run through insurance. Patient informed text message will come from area code  "415". Provided [employer] # [phone] and web site : [email or web address].   "[place] your test online" card with barcode and test tube ID provided to patient. Reviewed [facility] web site states 5-7 business days for results via their portal.   SHADO message will be sent to patient when [facility] receives results /provider reviews. 2 vials of blood drawn from  right arm by MERLE Glynn MA. Patient tolerated blood draw without difficulty. Specimens labeled with patient identifiers (name, date of birth, specimen collection date), order and specimen were verified with patient, packed and sent via [address]. [employer]  tracking #  [id number]  Copy of lab order scanned to Epic media. Maternal Fetal Medicine will have results in approximately 7-10 business days and will call patient or notify via [address] [place] [address]. Patient aware viewing lab result online will reveal fetal sex if ordered. Patient verbalized understanding of all instructions and no questions at this time.

## 2023-08-29 PROBLEM — O34.00 SEPTATE UTERUS AFFECTING PREGNANCY: Status: ACTIVE | Noted: 2023-08-29

## 2023-08-29 PROBLEM — O99.320 MARIJUANA USE DURING PREGNANCY: Status: ACTIVE | Noted: 2023-08-29

## 2023-08-29 PROBLEM — Z3A.12 12 WEEKS GESTATION OF PREGNANCY: Status: ACTIVE | Noted: 2023-08-29

## 2023-08-29 PROBLEM — Q51.28 SEPTATE UTERUS AFFECTING PREGNANCY: Status: ACTIVE | Noted: 2023-08-29

## 2023-08-29 PROBLEM — F12.90 MARIJUANA USE DURING PREGNANCY: Status: ACTIVE | Noted: 2023-08-29

## 2023-09-05 ENCOUNTER — TELEPHONE (OUTPATIENT)
Facility: HOSPITAL | Age: 23
End: 2023-09-05

## 2023-09-05 NOTE — TELEPHONE ENCOUNTER
Linwood called from [facility] asking that the prior authorization started by Erik be retroactive to 08/28/2023. Currently prior authorization was requested for 08/30/2023. Sent portal message to Erik to address this matter.

## 2023-09-14 ENCOUNTER — PATIENT MESSAGE (OUTPATIENT)
Dept: OBGYN CLINIC | Facility: MEDICAL CENTER | Age: 23
End: 2023-09-14

## 2023-09-14 NOTE — PATIENT COMMUNICATION
I spoke with the patient and provided some reassurance regarding brown tingued mucous discharge after recent intercourse. Advised to call if she has bright red bleeding. Blood type is A positive.     Kelly Vinson MD  [id number] [address] [facility]  9/14/2023 2:54 PM

## 2023-09-21 ENCOUNTER — APPOINTMENT (OUTPATIENT)
Dept: LAB | Facility: MEDICAL CENTER | Age: 23
End: 2023-09-21
Payer: MEDICARE

## 2023-09-21 ENCOUNTER — ROUTINE PRENATAL (OUTPATIENT)
Dept: OBGYN CLINIC | Facility: MEDICAL CENTER | Age: 23
End: 2023-09-21
Payer: MEDICARE

## 2023-09-21 VITALS — WEIGHT: 162 LBS | DIASTOLIC BLOOD PRESSURE: 70 MMHG | SYSTOLIC BLOOD PRESSURE: 100 MMHG | BODY MASS INDEX: 25.37 KG/M2

## 2023-09-21 DIAGNOSIS — Q51.28 SEPTATE UTERUS AFFECTING PREGNANCY IN SECOND TRIMESTER: ICD-10-CM

## 2023-09-21 DIAGNOSIS — F12.90 MARIJUANA USE DURING PREGNANCY: ICD-10-CM

## 2023-09-21 DIAGNOSIS — Z34.92 SECOND TRIMESTER PREGNANCY: ICD-10-CM

## 2023-09-21 DIAGNOSIS — Z3A.16 16 WEEKS GESTATION OF PREGNANCY: ICD-10-CM

## 2023-09-21 DIAGNOSIS — O99.320 MARIJUANA USE DURING PREGNANCY: ICD-10-CM

## 2023-09-21 DIAGNOSIS — O34.02 SEPTATE UTERUS AFFECTING PREGNANCY IN SECOND TRIMESTER: ICD-10-CM

## 2023-09-21 PROCEDURE — 82105 ALPHA-FETOPROTEIN SERUM: CPT

## 2023-09-21 PROCEDURE — 99213 OFFICE O/P EST LOW 20 MIN: CPT | Performed by: ADVANCED PRACTICE MIDWIFE

## 2023-09-21 PROCEDURE — 36415 COLL VENOUS BLD VENIPUNCTURE: CPT

## 2023-09-21 RX ORDER — ACETAMINOPHEN 500 MG
500 TABLET ORAL EVERY 6 HOURS PRN
COMMUNITY

## 2023-09-21 NOTE — ASSESSMENT & PLAN NOTE
- Reviewed 2nd trimester precautions  - no further bleeding  - NIPS- low risk  - anatomy scan 10/23/23  - next visit 4 weeks

## 2023-09-21 NOTE — PROGRESS NOTES
Routine Prenatal Visit  [facility] [address] [place] [address]    Assessment/Plan:  Susana Ott is a 26y.o. year old  at 16w0d who presents for routine prenatal visit. 1. Marijuana use during pregnancy  Assessment & Plan:  Irregular use of med marijuana      2. Septate uterus affecting pregnancy in second trimester    3. Second trimester pregnancy  -     Alpha fetoprotein, maternal  -     Alpha fetoprotein, maternal; Future    4. 16 weeks gestation of pregnancy  Assessment & Plan:  - Reviewed 2nd trimester precautions  - no further bleeding  - NIPS- low risk  - anatomy scan 10/23/23  - next visit 4 weeks    Orders:  -     Alpha fetoprotein, maternal  -     Alpha fetoprotein, maternal; Future        Subjective:     CC: Prenatal care    John Reinoso is a 26 y.o. 1 [facility] female who presents for routine prenatal care at 16w0d. Pregnancy ROS: no leakage of fluid, pelvic pain, or vaginal bleeding. Notes occasional fetal movement. The following portions of the patient's history were reviewed and updated as appropriate: allergies, current medications, past family history, past medical history, obstetric history, gynecologic history, past social history, past surgical history and problem list.      Objective:  /70   Wt 73.5 kg (162 lb)   LMP 2023 (Exact Date)   BMI 25.37 kg/m²   Pregravid Weight/BMI: Pregravid weight not on file (BMI Could not be calculated)  Current Weight: 73.5 kg (162 lb)   Total Weight Gain: Not found.    Pre-Ama Vitals    Flowsheet Row Most Recent Value   Prenatal Assessment    Fetal Heart Rate 154   Fundal Height (cm) 16 cm   Movement Present   Prenatal Vitals    Blood Pressure 100/70   Weight - Scale 73.5 kg (162 lb)   Urine Albumin/Glucose    Dilation/Effacement/Station    Vaginal Drainage    Edema    LLE Edema None   RLE Edema None           General: Well appearing, no distress  Respiratory: Unlabored breathing  Cardiovascular: Regular rate. Abdomen: Soft, gravid, nontender  Fundal Height: Appropriate for gestational age. Extremities: Warm and well perfused. Non tender.

## 2023-09-25 LAB
2ND TRIMESTER 4 SCREEN SERPL-IMP: NORMAL
AFP ADJ MOM SERPL: 1.44
AFP INTERP AMN-IMP: NORMAL
AFP INTERP SERPL-IMP: NORMAL
AFP INTERP SERPL-IMP: NORMAL
AFP SERPL-MCNC: 46.4 NG/ML
AGE AT DELIVERY: 22.8 YR
GA METHOD: NORMAL
GA: 16 WEEKS
IDDM PATIENT QL: NO
MULTIPLE PREGNANCY: NO
NEURAL TUBE DEFECT RISK FETUS: 3220 %